# Patient Record
Sex: FEMALE | Race: WHITE | NOT HISPANIC OR LATINO | Employment: FULL TIME | URBAN - NONMETROPOLITAN AREA
[De-identification: names, ages, dates, MRNs, and addresses within clinical notes are randomized per-mention and may not be internally consistent; named-entity substitution may affect disease eponyms.]

---

## 2017-04-28 RX ORDER — SUMATRIPTAN 100 MG/1
100 TABLET, FILM COATED ORAL ONCE AS NEEDED
Qty: 9 TABLET | Refills: 6 | Status: SHIPPED | OUTPATIENT
Start: 2017-04-28 | End: 2018-06-20 | Stop reason: SDUPTHER

## 2017-05-22 RX ORDER — FLUTICASONE PROPIONATE 50 MCG
SPRAY, SUSPENSION (ML) NASAL
Qty: 16 G | Refills: 10 | Status: SHIPPED | OUTPATIENT
Start: 2017-05-22 | End: 2019-02-20

## 2018-06-20 ENCOUNTER — OFFICE VISIT (OUTPATIENT)
Dept: INTERNAL MEDICINE | Facility: CLINIC | Age: 53
End: 2018-06-20

## 2018-06-20 VITALS
BODY MASS INDEX: 27.43 KG/M2 | HEIGHT: 68 IN | DIASTOLIC BLOOD PRESSURE: 78 MMHG | WEIGHT: 181 LBS | OXYGEN SATURATION: 98 % | SYSTOLIC BLOOD PRESSURE: 118 MMHG | TEMPERATURE: 97.4 F | HEART RATE: 83 BPM

## 2018-06-20 DIAGNOSIS — R39.9 URINARY SYMPTOM OR SIGN: ICD-10-CM

## 2018-06-20 DIAGNOSIS — G43.909 MIGRAINE WITHOUT STATUS MIGRAINOSUS, NOT INTRACTABLE, UNSPECIFIED MIGRAINE TYPE: ICD-10-CM

## 2018-06-20 LAB
BILIRUB BLD-MCNC: NEGATIVE MG/DL
CLARITY, POC: CLEAR
COLOR UR: YELLOW
GLUCOSE UR STRIP-MCNC: NEGATIVE MG/DL
KETONES UR QL: NEGATIVE
LEUKOCYTE EST, POC: ABNORMAL
NITRITE UR-MCNC: NEGATIVE MG/ML
PH UR: 6 [PH] (ref 5–8)
PROT UR STRIP-MCNC: NEGATIVE MG/DL
RBC # UR STRIP: ABNORMAL /UL
SP GR UR: 1.02 (ref 1–1.03)
UROBILINOGEN UR QL: NORMAL

## 2018-06-20 PROCEDURE — 99396 PREV VISIT EST AGE 40-64: CPT | Performed by: PHYSICIAN ASSISTANT

## 2018-06-20 PROCEDURE — 81003 URINALYSIS AUTO W/O SCOPE: CPT | Performed by: PHYSICIAN ASSISTANT

## 2018-06-20 RX ORDER — SUMATRIPTAN 100 MG/1
100 TABLET, FILM COATED ORAL ONCE AS NEEDED
Qty: 9 TABLET | Refills: 6 | Status: SHIPPED | OUTPATIENT
Start: 2018-06-20 | End: 2019-09-09 | Stop reason: SDUPTHER

## 2018-06-22 LAB
BACTERIA UR CULT: NO GROWTH
BACTERIA UR CULT: NORMAL

## 2018-08-08 ENCOUNTER — TRANSCRIBE ORDERS (OUTPATIENT)
Dept: ADMINISTRATIVE | Facility: HOSPITAL | Age: 53
End: 2018-08-08

## 2018-08-08 ENCOUNTER — APPOINTMENT (OUTPATIENT)
Dept: BONE DENSITY | Facility: HOSPITAL | Age: 53
End: 2018-08-08

## 2018-08-08 DIAGNOSIS — M86.9 SAPHO SYNDROME (HCC): ICD-10-CM

## 2018-08-08 DIAGNOSIS — M86.9 SAPHO SYNDROME (HCC): Primary | ICD-10-CM

## 2018-08-08 DIAGNOSIS — M65.9 SAPHO SYNDROME (HCC): Primary | ICD-10-CM

## 2018-08-08 DIAGNOSIS — M85.80 SAPHO SYNDROME (HCC): ICD-10-CM

## 2018-08-08 DIAGNOSIS — M85.80 SAPHO SYNDROME (HCC): Primary | ICD-10-CM

## 2018-08-08 DIAGNOSIS — L40.3 SAPHO SYNDROME (HCC): Primary | ICD-10-CM

## 2018-08-08 DIAGNOSIS — L70.9 SAPHO SYNDROME (HCC): ICD-10-CM

## 2018-08-08 DIAGNOSIS — M65.9 SAPHO SYNDROME (HCC): ICD-10-CM

## 2018-08-08 DIAGNOSIS — L70.9 SAPHO SYNDROME (HCC): Primary | ICD-10-CM

## 2018-08-08 DIAGNOSIS — L40.3 SAPHO SYNDROME (HCC): ICD-10-CM

## 2018-08-08 PROCEDURE — 77080 DXA BONE DENSITY AXIAL: CPT

## 2018-09-24 ENCOUNTER — PREP FOR SURGERY (OUTPATIENT)
Dept: OTHER | Facility: HOSPITAL | Age: 53
End: 2018-09-24

## 2018-09-24 DIAGNOSIS — K21.00 REFLUX ESOPHAGITIS: Primary | ICD-10-CM

## 2018-09-27 PROBLEM — K21.00 REFLUX ESOPHAGITIS: Status: ACTIVE | Noted: 2018-09-27

## 2018-10-23 ENCOUNTER — TELEPHONE (OUTPATIENT)
Dept: SURGERY | Facility: CLINIC | Age: 53
End: 2018-10-23

## 2018-11-09 ENCOUNTER — TELEPHONE (OUTPATIENT)
Dept: SURGERY | Facility: CLINIC | Age: 53
End: 2018-11-09

## 2018-11-09 RX ORDER — MOMETASONE FUROATE 50 UG/1
2 SPRAY, METERED NASAL DAILY PRN
COMMUNITY
End: 2020-11-13

## 2018-11-09 RX ORDER — ALENDRONATE SODIUM 70 MG/1
70 TABLET ORAL
COMMUNITY
End: 2019-02-20

## 2018-11-12 ENCOUNTER — TELEPHONE (OUTPATIENT)
Dept: SURGERY | Facility: CLINIC | Age: 53
End: 2018-11-12

## 2018-11-12 NOTE — TELEPHONE ENCOUNTER
Patient called saying she wanted to canceled her EGD that was scheduled for today due to no tmeeting her deductible asks to have in  January 2019 . Put on recall for January 2019

## 2019-01-04 ENCOUNTER — TELEPHONE (OUTPATIENT)
Dept: SURGERY | Facility: CLINIC | Age: 54
End: 2019-01-04

## 2019-02-13 ENCOUNTER — TELEPHONE (OUTPATIENT)
Dept: SURGERY | Facility: CLINIC | Age: 54
End: 2019-02-13

## 2019-02-13 NOTE — TELEPHONE ENCOUNTER
Called the patient to remind them of an upcoming appointment with general surgery. I was able to reach to reach the patient. Left message on  cell.

## 2019-02-15 ENCOUNTER — ANESTHESIA (OUTPATIENT)
Dept: GASTROENTEROLOGY | Facility: HOSPITAL | Age: 54
End: 2019-02-15

## 2019-02-15 ENCOUNTER — HOSPITAL ENCOUNTER (OUTPATIENT)
Facility: HOSPITAL | Age: 54
Setting detail: HOSPITAL OUTPATIENT SURGERY
Discharge: HOME | End: 2019-02-15
Attending: SURGERY | Admitting: SURGERY

## 2019-02-15 ENCOUNTER — ANESTHESIA EVENT (OUTPATIENT)
Dept: GASTROENTEROLOGY | Facility: HOSPITAL | Age: 54
End: 2019-02-15

## 2019-02-15 VITALS
RESPIRATION RATE: 18 BRPM | HEIGHT: 68 IN | WEIGHT: 170 LBS | OXYGEN SATURATION: 100 % | HEART RATE: 79 BPM | BODY MASS INDEX: 25.76 KG/M2 | SYSTOLIC BLOOD PRESSURE: 106 MMHG | DIASTOLIC BLOOD PRESSURE: 83 MMHG | TEMPERATURE: 97.7 F

## 2019-02-15 DIAGNOSIS — K21.00 REFLUX ESOPHAGITIS: ICD-10-CM

## 2019-02-15 PROCEDURE — 25010000002 MIDAZOLAM PER 1 MG: Performed by: NURSE ANESTHETIST, CERTIFIED REGISTERED

## 2019-02-15 PROCEDURE — S0260 H&P FOR SURGERY: HCPCS | Performed by: SURGERY

## 2019-02-15 PROCEDURE — 25010000002 PROPOFOL 10 MG/ML EMULSION: Performed by: NURSE ANESTHETIST, CERTIFIED REGISTERED

## 2019-02-15 RX ORDER — MIDAZOLAM HYDROCHLORIDE 1 MG/ML
INJECTION INTRAMUSCULAR; INTRAVENOUS AS NEEDED
Status: DISCONTINUED | OUTPATIENT
Start: 2019-02-15 | End: 2019-02-15 | Stop reason: SURG

## 2019-02-15 RX ORDER — PROPOFOL 10 MG/ML
VIAL (ML) INTRAVENOUS AS NEEDED
Status: DISCONTINUED | OUTPATIENT
Start: 2019-02-15 | End: 2019-02-15 | Stop reason: SURG

## 2019-02-15 RX ORDER — ONDANSETRON 2 MG/ML
4 INJECTION INTRAMUSCULAR; INTRAVENOUS ONCE AS NEEDED
Status: DISCONTINUED | OUTPATIENT
Start: 2019-02-15 | End: 2019-02-15 | Stop reason: HOSPADM

## 2019-02-15 RX ORDER — SODIUM CHLORIDE, SODIUM LACTATE, POTASSIUM CHLORIDE, CALCIUM CHLORIDE 600; 310; 30; 20 MG/100ML; MG/100ML; MG/100ML; MG/100ML
1000 INJECTION, SOLUTION INTRAVENOUS CONTINUOUS
Status: DISCONTINUED | OUTPATIENT
Start: 2019-02-15 | End: 2019-02-15 | Stop reason: HOSPADM

## 2019-02-15 RX ORDER — SODIUM CHLORIDE 0.9 % (FLUSH) 0.9 %
3 SYRINGE (ML) INJECTION AS NEEDED
Status: DISCONTINUED | OUTPATIENT
Start: 2019-02-15 | End: 2019-02-15 | Stop reason: HOSPADM

## 2019-02-15 RX ADMIN — PROPOFOL 50 MG: 10 INJECTION, EMULSION INTRAVENOUS at 09:20

## 2019-02-15 RX ADMIN — SODIUM CHLORIDE, POTASSIUM CHLORIDE, SODIUM LACTATE AND CALCIUM CHLORIDE 1000 ML: 600; 310; 30; 20 INJECTION, SOLUTION INTRAVENOUS at 07:49

## 2019-02-15 RX ADMIN — MIDAZOLAM HYDROCHLORIDE 2 MG: 1 INJECTION, SOLUTION INTRAMUSCULAR; INTRAVENOUS at 09:20

## 2019-02-15 RX ADMIN — PROPOFOL 50 MG: 10 INJECTION, EMULSION INTRAVENOUS at 09:25

## 2019-02-15 NOTE — DISCHARGE INSTRUCTIONS
Please follow all post op instructions and follow up appointment time from your physician's office included in your discharge packet.    Rest today  No pushing,pulling,tugging,heavy lifting, or strenuous activity   No major decision making,driving,or drinking alcoholic beverages for 24 hours due to the sedation you received  Always use good hand hygiene/washing technique  No driving on pain medication.    To assist you in voiding:  Drink plenty of fluids  Listen to running water while attempting to void.    If you are unable to urinate and you have an uncomfortable urge to void or it has been   6 hours since you were discharged, return to the Emergency Room.

## 2019-02-15 NOTE — ANESTHESIA PREPROCEDURE EVALUATION
Anesthesia Evaluation     Patient summary reviewed and Nursing notes reviewed   NPO Solid Status: > 8 hours  NPO Liquid Status: > 8 hours           Airway   Mallampati: II  TM distance: >3 FB  Neck ROM: full  No difficulty expected  Dental      Pulmonary    Cardiovascular   Exercise tolerance: good (4-7 METS)    (+) valvular problems/murmurs MVP,       Neuro/Psych  (+) headaches,     GI/Hepatic/Renal/Endo    (+)  GERD,      Musculoskeletal     Abdominal    Substance History      OB/GYN          Other   (+) arthritis                     Anesthesia Plan    ASA 2     MAC     intravenous induction   Anesthetic plan, all risks, benefits, and alternatives have been provided, discussed and informed consent has been obtained with: patient.    Plan discussed with CRNA.

## 2019-02-15 NOTE — H&P
[facility]   HISTORY AND PHYSICAL      Name:  Agatha Carbajal   Age:  53 y.o.  Sex:  female  :  1965  MRN:  [id number]   Visit Number:  [id number]  Admission Date:  2/15/2019  Date Of Service:  02/15/19  Primary Care Physician:  Fawn Fischer MD    Chief Complaint:     Reflux esophagitis    History Of Presenting Illness:      Patient here for EGD, has chronic reflux about once to twice a week, resolved with over-the-counter antacids.  Also has hoarseness and constant cough.  Concerned about significant esophagitis and reflux symptoms    Review Of Systems:     General ROS: Patient denies any fevers, chills or loss of consciousness.  No complaints of generalized weakness  Psychological ROS: Denies any hallucinations and delusions.  Ophthalmic ROS: no transient loss of vision.  ENT ROS: Denies sore throat, nasal congestion or ear pain.   Allergy and Immunology ROS: Denies rash or itching.  Hematological and Lymphatic ROS: Denies neck swelling or easy bleeding.  Endocrine ROS: Denies any recent unintentional weight gain or loss.  Breast ROS: Denies any pain or swelling.  Respiratory ROS: Denies cough or shortness of breath.   Cardiovascular ROS: Denies chest pain or palpitations. No history of exertional chest pain.   Gastrointestinal ROS: Denies nausea and vomiting. Denies any abdominal pain. No diarrhea.   Genito-Urinary ROS: Denies dysuria or hematuria.  Musculoskeletal ROS: no back pain. No muscle pain. No calf pain.   Neurological ROS: Denies any focal weakness. No loss of consciousness. Denies any numbness.   Dermatological ROS: Denies any redness or pruritis.     Past Medical History:    Past Medical History:   Diagnosis Date   • Acid reflux    • Arthritis    • Bilateral ovarian cysts    • Breast cancer (CMS/HCC) 2009    RIGHT   • Bronchitis    • Kidney stone    • Migraine headache    • MVP (mitral valve prolapse)    • Osteoporosis    • Piercing     EARS ONLY   • Skin cancer      BASAL CELL ON CHEST, SQUAMOUS CELL RIGHT ARM   • Tinnitus    • Wears glasses     TO READ       Past Surgical history:    Past Surgical History:   Procedure Laterality Date   • BREAST AUGMENTATION Bilateral 2010   • BREAST BIOPSY Right    • COLONOSCOPY  2016   • HYSTERECTOMY      COMPLETE   • MASTECTOMY Bilateral 2009   • SKIN BIOPSY      chest, right arm       Social History:    Social History     Socioeconomic History   • Marital status:      Spouse name: Not on file   • Number of children: Not on file   • Years of education: Not on file   • Highest education level: Not on file   Social Needs   • Financial resource strain: Not on file   • Food insecurity - worry: Not on file   • Food insecurity - inability: Not on file   • Transportation needs - medical: Not on file   • Transportation needs - non-medical: Not on file   Occupational History   • Not on file   Tobacco Use   • Smoking status: Never Smoker   • Smokeless tobacco: Never Used   Substance and Sexual Activity   • Alcohol use: No   • Drug use: No   • Sexual activity: Defer   Other Topics Concern   • Not on file   Social History Narrative   • Not on file       Family History:    Family History   Problem Relation Age of Onset   • Arthritis Mother    • Migraines Mother    • Prostate cancer Father    • Hypertension Father    • Kidney disease Father    • Obesity Father    • Breast cancer Sister    • Heart disease Maternal Grandmother    • Osteoporosis Maternal Grandmother    • Heart disease Paternal Grandfather    • Tuberculosis Paternal Grandfather        Allergies:      Demerol [meperidine] and Oxycodone    Home Medications:    Prior to Admission Medications     Prescriptions Last Dose Informant Patient Reported? Taking?    calcium citrate-vitamin d (CALCITRATE) 315-250 MG-UNIT tablet tablet 2/14/2019  Yes Yes    Take 2 tablets by mouth Daily.    ibuprofen (ADVIL,MOTRIN) 800 MG tablet Past Month Self Yes Yes    Take 800 mg by mouth Every 6 (Six) Hours  As Needed.    mometasone (NASONEX) 50 MCG/ACT nasal spray Past Week Self Yes Yes    2 sprays into the nostril(s) as directed by provider Daily As Needed.    SUMAtriptan (IMITREX) 100 MG tablet 2/14/2019 Self No Yes    Take 1 tablet by mouth 1 (One) Time As Needed for Migraine for up to 2 doses.    alendronate (FOSAMAX) 70 MG tablet  Self Yes No    Take 70 mg by mouth Every 7 (Seven) Days. ON TUESDAYS    fluticasone (FLONASE) 50 MCG/ACT nasal spray More than a month Self No No    INHALE 1 SPRAY IN EACH NOSTRIL TWO TIMES A DAY FOR 30 DAYS AS DIRECTED BY DOCTOR              ED Medications:    Medications   lactated ringers infusion 1,000 mL (1,000 mL Intravenous New Bag 2/15/19 0749)       Vital Signs:    Temp:  [97 °F (36.1 °C)] 97 °F (36.1 °C)  Heart Rate:  [78] 78  Resp:  [18] 18  BP: (114)/(74) 114/74        11/09/18  0940 12/28/18  1519 02/13/19  1419   Weight: 77.1 kg (170 lb) 77.1 kg (170 lb) 77.1 kg (170 lb)       Body mass index is 25.85 kg/m².    Physical Exam:      General Appearance:  Alert and cooperative, not in any acute distress.   Head:  Atraumatic and normocephalic, without obvious abnormality.   Eyes:          PERRLA, conjunctivae and sclerae normal, no Icterus. No pallor. Extra-occular movements are within normal limits.   Ears:  Ears appear intact with no abnormalities noted.   Throat: No oral lesions, no thrush, oral mucosa moist.   Neck: Supple, trachea midline, no thyromegaly, no carotid bruit.       Respiratory/Lungs:   Breath sounds heard bilaterally equally.  No crackles or wheezing. No Pleural rub or bronchial breathing. Normal respiratory effort.    Cardiovascular/Heart:  Normal S1 and S2, no murmur. No edema   GI/Abdomen:   No masses, no hepatosplenomegaly. Soft, non-tender, non-distended, no hernia                 Musculoskeletal/ Extremities:   Moves all extremities well   Pulses: Pulses palpable and equal bilaterally   Skin: No bleeding, bruising or rash, no induration   Lymph nodes: No  palpable adenopathy   Psychiatric : Alert and oriented ×3.  No depression or anxiety            EKG:      None    Labs:    Lab Results (last 24 hours)     ** No results found for the last 24 hours. **          Radiology:    Imaging Results (last 72 hours)     ** No results found for the last 72 hours. **          Assessment:    Reflux esophagitis, hoarseness with concern for esophagitis     Plan:     EGD with biopsy today.  No previous EGD in the past.    Mick Triplett MD  02/15/19  9:26 AM

## 2019-02-15 NOTE — ANESTHESIA POSTPROCEDURE EVALUATION
Patient: Agatha Carbajal    Procedure Summary     Date:  02/15/19 Room / Location:  [facility] ENDOSCOPY 3 / [facility] ENDOSCOPY    Anesthesia Start:  0923 Anesthesia Stop:  0936    Procedure:  ESOPHAGOGASTRODUODENOSCOPY WITH COLD FORCEP BIOPSIES (N/A Esophagus) Diagnosis:       Reflux esophagitis      (Reflux esophagitis [K21.0])    Surgeon:  Mick Triplett MD Provider:  Conrad Coker CRNA    Anesthesia Type:  MAC ASA Status:  2          Anesthesia Type: MAC  Last vitals  BP   123/85 (02/15/19 0937)   Temp   97.7 °F (36.5 °C) (02/15/19 0937)   Pulse   105 (02/15/19 0937)   Resp   16 (02/15/19 0937)     SpO2   98 % (02/15/19 0937)     Post Anesthesia Care and Evaluation    Patient location during evaluation: bedside  Patient participation: complete - patient participated  Level of consciousness: awake  Pain score: 0  Pain management: adequate  Airway patency: patent  Anesthetic complications: No anesthetic complications  PONV Status: controlled  Cardiovascular status: acceptable and stable  Respiratory status: acceptable and room air  Hydration status: acceptable

## 2019-02-18 ENCOUNTER — TELEPHONE (OUTPATIENT)
Dept: SURGERY | Facility: CLINIC | Age: 54
End: 2019-02-18

## 2019-02-20 ENCOUNTER — OFFICE VISIT (OUTPATIENT)
Dept: SURGERY | Facility: CLINIC | Age: 54
End: 2019-02-20

## 2019-02-20 VITALS
HEIGHT: 68 IN | HEART RATE: 84 BPM | SYSTOLIC BLOOD PRESSURE: 120 MMHG | TEMPERATURE: 95.5 F | DIASTOLIC BLOOD PRESSURE: 70 MMHG | WEIGHT: 170 LBS | OXYGEN SATURATION: 97 % | BODY MASS INDEX: 25.76 KG/M2

## 2019-02-20 DIAGNOSIS — K21.00 GASTROESOPHAGEAL REFLUX DISEASE WITH ESOPHAGITIS: Primary | ICD-10-CM

## 2019-02-20 DIAGNOSIS — R10.13 EPIGASTRIC PAIN: ICD-10-CM

## 2019-02-20 DIAGNOSIS — H65.06 RECURRENT ACUTE SEROUS OTITIS MEDIA OF BOTH EARS: Primary | ICD-10-CM

## 2019-02-20 PROCEDURE — 99212 OFFICE O/P EST SF 10 MIN: CPT | Performed by: SURGERY

## 2019-02-20 NOTE — PROGRESS NOTES
Patient: Agatha Carbajal    YOB: 1965    Date: 02/20/2019    Primary Care Provider: Fawn Fischer MD    Reason for Consultation: Follow-up EGD    Chief Complaint   Patient presents with   • Follow-up     EGD       History of present illness:  I saw the patient in the office today as a followup from their recent EGD with biopsy, the pathology report did show squamous mucosa with features suggestive of reflux esophagitis, glandular mucosa with mild chronic inflammation.  They state that they have done well and is not having any problems.  Patient complained of persistent foamy fluid, without and a chronic cough.  Patient has from the fluid behind the ears, hearing and recommended 3 years ago.  Patient would like to have an ENT evaluation for allergies and reason for sinus issues and ear problems.  Reflux symptoms controlled with Pepcid complete and avoiding caffeine and late meals.    The following portions of the patient's history were reviewed and updated as appropriate: allergies, current medications, past family history, past medical history, past social history, past surgical history and problem list.      Review of Systems   Constitutional: Negative for activity change, appetite change, chills, fever and unexpected weight change.   HENT: Negative for congestion, hearing loss, trouble swallowing and voice change.    Eyes: Negative for visual disturbance.   Respiratory: Negative for apnea, cough, choking, chest tightness, shortness of breath and wheezing.    Cardiovascular: Negative for chest pain, palpitations and leg swelling.   Gastrointestinal: Negative for abdominal distention, abdominal pain, anal bleeding, blood in stool, constipation, diarrhea, nausea, rectal pain and vomiting.   Endocrine: Negative for cold intolerance and heat intolerance.   Genitourinary: Negative for difficulty urinating, dysuria, flank pain and frequency.   Musculoskeletal: Negative for back pain, gait problem and  "myalgias.   Skin: Negative for color change, rash and wound.   Neurological: Negative for dizziness, seizures, syncope, facial asymmetry, speech difficulty, weakness, light-headedness, numbness and headaches.   Hematological: Negative for adenopathy. Does not bruise/bleed easily.   Psychiatric/Behavioral: Negative for agitation, behavioral problems and confusion. The patient is not nervous/anxious.        Vital Signs:   Vitals:    02/20/19 0831   BP: 120/70   Pulse: 84   Temp: 95.5 °F (35.3 °C)   TempSrc: Temporal   SpO2: 97%   Weight: 77.1 kg (170 lb)   Height: 172.7 cm (68\")       Allergies:  Allergies   Allergen Reactions   • Demerol [Meperidine] Nausea And Vomiting   • Oxycodone Other (See Comments)     HEADACHE         Medications:    Current Outpatient Medications:   •  calcium citrate-vitamin d (CALCITRATE) 315-250 MG-UNIT tablet tablet, Take 2 tablets by mouth Daily., Disp: , Rfl:   •  ibuprofen (ADVIL,MOTRIN) 800 MG tablet, Take 800 mg by mouth Every 6 (Six) Hours As Needed., Disp: , Rfl:   •  mometasone (NASONEX) 50 MCG/ACT nasal spray, 2 sprays into the nostril(s) as directed by provider Daily As Needed., Disp: , Rfl:   •  SUMAtriptan (IMITREX) 100 MG tablet, Take 1 tablet by mouth 1 (One) Time As Needed for Migraine for up to 2 doses., Disp: 9 tablet, Rfl: 6    Physical Exam:   General Appearance:    Alert, cooperative, in no acute distress   Abdomen:     no masses, no organomegaly, soft non-tender, non-distended, no guarding, wounds are well healed, no evidence of recurrent hernia   Chest:      Clear toausculation            Cor:  Regular rate and rhythm      Results Review:   I reviewed the patient's new clinical results.    Assessment / Plan:    1. Gastroesophageal reflux disease with esophagitis    2. Epigastric pain        I did discuss the situation with the patient today in the office and they have done well from their recent EGD with biopsy. I have told the patient to continue Pepcid complete.  " If symptoms do not improve, we will call and probably safer daily consumption.  Also referred to ENT for evaluation of fluid behind the ears and sinus drainage with chronic allergies..    Electronically signed by Mick Triplett MD  02/20/19

## 2019-02-21 LAB
LAB AP CASE REPORT: NORMAL
PATH REPORT.FINAL DX SPEC: NORMAL

## 2019-03-07 ENCOUNTER — OFFICE VISIT (OUTPATIENT)
Dept: INTERNAL MEDICINE | Facility: CLINIC | Age: 54
End: 2019-03-07

## 2019-03-07 VITALS
BODY MASS INDEX: 27.28 KG/M2 | OXYGEN SATURATION: 98 % | WEIGHT: 180 LBS | HEIGHT: 68 IN | RESPIRATION RATE: 16 BRPM | TEMPERATURE: 97.1 F | HEART RATE: 89 BPM | SYSTOLIC BLOOD PRESSURE: 139 MMHG | DIASTOLIC BLOOD PRESSURE: 79 MMHG

## 2019-03-07 DIAGNOSIS — J20.9 ACUTE BRONCHITIS WITH BRONCHOSPASM: Primary | ICD-10-CM

## 2019-03-07 DIAGNOSIS — H66.003 ACUTE SUPPURATIVE OTITIS MEDIA OF BOTH EARS WITHOUT SPONTANEOUS RUPTURE OF TYMPANIC MEMBRANES, RECURRENCE NOT SPECIFIED: ICD-10-CM

## 2019-03-07 DIAGNOSIS — J01.01 ACUTE RECURRENT MAXILLARY SINUSITIS: ICD-10-CM

## 2019-03-07 PROCEDURE — 99213 OFFICE O/P EST LOW 20 MIN: CPT | Performed by: INTERNAL MEDICINE

## 2019-03-07 PROCEDURE — 96372 THER/PROPH/DIAG INJ SC/IM: CPT | Performed by: INTERNAL MEDICINE

## 2019-03-07 RX ORDER — DOXYCYCLINE 100 MG/1
100 CAPSULE ORAL 2 TIMES DAILY
Qty: 20 CAPSULE | Refills: 0 | Status: SHIPPED | OUTPATIENT
Start: 2019-03-07 | End: 2019-03-17

## 2019-03-07 RX ORDER — METHYLPREDNISOLONE 4 MG/1
TABLET ORAL
Qty: 21 TABLET | Refills: 0 | Status: SHIPPED | OUTPATIENT
Start: 2019-03-07 | End: 2019-03-28

## 2019-03-07 RX ORDER — METHYLPREDNISOLONE SODIUM SUCCINATE 125 MG/2ML
125 INJECTION, POWDER, LYOPHILIZED, FOR SOLUTION INTRAMUSCULAR; INTRAVENOUS ONCE
Status: COMPLETED | OUTPATIENT
Start: 2019-03-07 | End: 2019-03-07

## 2019-03-07 RX ORDER — ALBUTEROL SULFATE 90 UG/1
2 AEROSOL, METERED RESPIRATORY (INHALATION) EVERY 4 HOURS PRN
Qty: 1 INHALER | Refills: 2 | Status: SHIPPED | OUTPATIENT
Start: 2019-03-07 | End: 2020-11-13

## 2019-03-07 RX ADMIN — METHYLPREDNISOLONE SODIUM SUCCINATE 125 MG: 125 INJECTION, POWDER, LYOPHILIZED, FOR SOLUTION INTRAMUSCULAR; INTRAVENOUS at 16:35

## 2019-03-07 NOTE — PROGRESS NOTES
Subjective   Agatha Carbajal is a 53 y.o. female.     Chief Complaint   Patient presents with   • Cough   • Nasal Congestion   • Sinus Problem   • Earache       History of Present Illness   Patient complains of cough ,    Nasal congestion and  Sinus congestion  And ear fullness  With pain in both ears , she took otc mucinex dm , she states she had a fever of 99,  she states she went to the  [facility]  And was on bactrim and she got a rash , she stopped it and then went back and was put on ceftin she finished it a 10 days ago and she also then went back and was  Put on prednisone  For 5 days  And finished it  2-3 days ago ,  She states now she is yet symptomatic    The following portions of the patient's history were reviewed and updated as appropriate: allergies, current medications, past family history, past medical history, past social history, past surgical history and problem list.    Review of Systems   Constitutional: Negative for chills, fatigue and fever.   HENT: Positive for congestion, ear pain, postnasal drip, rhinorrhea, sinus pressure and sinus pain.    Eyes: Negative for pain and itching.   Respiratory: Positive for cough. Negative for chest tightness and shortness of breath.    Cardiovascular: Negative for chest pain and palpitations.   Gastrointestinal: Negative for blood in stool, constipation, diarrhea and nausea.   Endocrine: Negative for polydipsia, polyphagia and polyuria.   Genitourinary: Negative for dysuria, flank pain and frequency.   Musculoskeletal: Negative for arthralgias and back pain.   Skin: Negative for rash and wound.   Neurological: Negative for syncope, light-headedness, numbness and headaches.   Hematological: Negative for adenopathy.   Psychiatric/Behavioral: Negative for behavioral problems, decreased concentration, dysphoric mood and sleep disturbance. The patient is not nervous/anxious.          Current Outpatient Medications:   •  calcium citrate-vitamin d (CALCITRATE)  "315-250 MG-UNIT tablet tablet, Take 2 tablets by mouth Daily., Disp: , Rfl:   •  ibuprofen (ADVIL,MOTRIN) 800 MG tablet, Take 800 mg by mouth Every 6 (Six) Hours As Needed., Disp: , Rfl:   •  mometasone (NASONEX) 50 MCG/ACT nasal spray, 2 sprays into the nostril(s) as directed by provider Daily As Needed., Disp: , Rfl:   •  SUMAtriptan (IMITREX) 100 MG tablet, Take 1 tablet by mouth 1 (One) Time As Needed for Migraine for up to 2 doses., Disp: 9 tablet, Rfl: 6  •  albuterol sulfate  (90 Base) MCG/ACT inhaler, Inhale 2 puffs Every 4 (Four) Hours As Needed for Wheezing., Disp: 1 inhaler, Rfl: 2  •  doxycycline (MONODOX) 100 MG capsule, Take 1 capsule by mouth 2 (Two) Times a Day for 10 days., Disp: 20 capsule, Rfl: 0  •  methylPREDNISolone (MEDROL, MÓNICA,) 4 MG tablet, Take as directed on package instructions., Disp: 21 tablet, Rfl: 0    Objective     Blood pressure 139/79, pulse 89, temperature 97.1 °F (36.2 °C), resp. rate 16, height 172.7 cm (67.99\"), weight 81.6 kg (180 lb), SpO2 98 %, not currently breastfeeding.    Physical Exam   Constitutional: She is oriented to person, place, and time. She appears well-developed and well-nourished.   HENT:   Head: Normocephalic and atraumatic.   Right Ear: Tympanic membrane is erythematous.   Left Ear: Tympanic membrane is erythematous.   Nose: Rhinorrhea present. Right sinus exhibits maxillary sinus tenderness. Left sinus exhibits maxillary sinus tenderness.   Mouth/Throat: Oropharyngeal exudate present.   Eyes: Conjunctivae and EOM are normal. Pupils are equal, round, and reactive to light.   Neck: Normal range of motion. Neck supple.   Cardiovascular: Normal rate, regular rhythm and normal heart sounds.   Pulmonary/Chest: Effort normal. She has wheezes.   Abdominal: Soft. Bowel sounds are normal.   Musculoskeletal: Normal range of motion. She exhibits no edema, tenderness or deformity.   Neurological: She is alert and oriented to person, place, and time.   Skin: Skin " is warm and dry.   Psychiatric: She has a normal mood and affect.     Patient's Body mass index is 27.38 kg/m². BMI is within normal parameters. No follow-up required..      Results for orders placed or performed during the hospital encounter of 02/15/19   Tissue Pathology Exam   Result Value Ref Range    Case Report       Surgical Pathology Report                         Case: [id number]                                  Authorizing Provider:  Mick Triplett MD        Collected:           02/15/2019 09:30 AM          Ordering Location:     [facility]    Received:            02/15/2019 02:55 PM                                 [facility]                                                                    Pathologist:           Landon Winn MD                                                             Specimen:    Esophagus, ESOPHAGEAL BIOPSIES                                                             Final Diagnosis       See Scanned Report             Assessment/Plan   Agatha was seen today for cough, nasal congestion, sinus problem and earache.    Diagnoses and all orders for this visit:    Acute bronchitis with bronchospasm  -     methylPREDNISolone sodium succinate (SOLU-Medrol) injection 125 mg; Inject 2 mL into the appropriate muscle as directed by prescriber 1 (One) Time.    Acute suppurative otitis media of both ears without spontaneous rupture of tympanic membranes, recurrence not specified    Acute recurrent maxillary sinusitis    Other orders  -     doxycycline (MONODOX) 100 MG capsule; Take 1 capsule by mouth 2 (Two) Times a Day for 10 days.  -     albuterol sulfate  (90 Base) MCG/ACT inhaler; Inhale 2 puffs Every 4 (Four) Hours As Needed for Wheezing.  -     methylPREDNISolone (MEDROL, MÓNICA,) 4 MG tablet; Take as directed on package instructions.       Plan :   1. Acute bronchitis: We will start oral antibiotics , IM solumedrol given today , start medrol dose pack  And inhaler  q4-6  hrs prn  2.Acute suppurative otitis media: will   start oral antibiotics    3.Acute maxillary sinusitis:   will   start oral antibiotics                Fawn Fischer MD

## 2019-03-13 ENCOUNTER — TELEPHONE (OUTPATIENT)
Dept: INTERNAL MEDICINE | Facility: CLINIC | Age: 54
End: 2019-03-13

## 2019-03-13 NOTE — TELEPHONE ENCOUNTER
Called reporting that she is feeling much better since her visit 3/7/2019, but that she feels like she is having some tightening in her throat or what she thinks may be tightening in her throat  She is concerned that this could be due to the ABX has not been using her inhaler because Dr. CLARK advised her not to unless she was wheezing  Wanted to know what she should do skipped her last dose of ABX   Pt has finished the steroid pack

## 2019-03-13 NOTE — TELEPHONE ENCOUNTER
Ok to hold off on last dose of antibiotics.  Steroids may help with swelling.  If symptoms are stable and are not worsening, it is reasonable to wait it out.  She can take a benedryl to also help with swelling.  If there is any other concerns, it may be best to come in for appointment.     Should swelling worsen where it becomes hard to breath, she should come in to the ER.

## 2019-03-27 ENCOUNTER — TELEPHONE (OUTPATIENT)
Dept: INTERNAL MEDICINE | Facility: CLINIC | Age: 54
End: 2019-03-27

## 2019-03-27 DIAGNOSIS — R39.9 URINARY SYMPTOM OR SIGN: Primary | ICD-10-CM

## 2019-03-27 NOTE — TELEPHONE ENCOUNTER
Patient called and states that she was prescribed doxycycline from  she states that she developed a reaction. She states she was suppose to take it for 10 days and she only completed 5 days. She states she called our office and the nurse advised her to stop taking this medication. She states she is having returning symptoms of bronchitis. She also states that she possibly has a UTI. She would like to see if there is something else she  could try, she states that she doesn't want to do any more antibiotics.

## 2019-03-28 ENCOUNTER — OFFICE VISIT (OUTPATIENT)
Dept: INTERNAL MEDICINE | Facility: CLINIC | Age: 54
End: 2019-03-28

## 2019-03-28 VITALS
TEMPERATURE: 98.9 F | HEIGHT: 68 IN | RESPIRATION RATE: 16 BRPM | OXYGEN SATURATION: 99 % | DIASTOLIC BLOOD PRESSURE: 79 MMHG | WEIGHT: 185 LBS | SYSTOLIC BLOOD PRESSURE: 139 MMHG | BODY MASS INDEX: 28.04 KG/M2 | HEART RATE: 90 BPM

## 2019-03-28 DIAGNOSIS — K21.00 REFLUX ESOPHAGITIS: ICD-10-CM

## 2019-03-28 DIAGNOSIS — R31.9 URINARY TRACT INFECTION WITH HEMATURIA, SITE UNSPECIFIED: Primary | ICD-10-CM

## 2019-03-28 DIAGNOSIS — N39.0 URINARY TRACT INFECTION WITH HEMATURIA, SITE UNSPECIFIED: Primary | ICD-10-CM

## 2019-03-28 DIAGNOSIS — K59.04 CHRONIC IDIOPATHIC CONSTIPATION: ICD-10-CM

## 2019-03-28 DIAGNOSIS — R03.0 ELEVATED BLOOD PRESSURE READING: ICD-10-CM

## 2019-03-28 LAB
BILIRUB BLD-MCNC: ABNORMAL MG/DL
CLARITY, POC: ABNORMAL
COLOR UR: YELLOW
GLUCOSE UR STRIP-MCNC: NEGATIVE MG/DL
KETONES UR QL: NEGATIVE
LEUKOCYTE EST, POC: ABNORMAL
NITRITE UR-MCNC: POSITIVE MG/ML
PH UR: 7 [PH] (ref 5–8)
PROT UR STRIP-MCNC: ABNORMAL MG/DL
RBC # UR STRIP: ABNORMAL /UL
SP GR UR: 1.03 (ref 1–1.03)
UROBILINOGEN UR QL: ABNORMAL

## 2019-03-28 PROCEDURE — 99214 OFFICE O/P EST MOD 30 MIN: CPT | Performed by: NURSE PRACTITIONER

## 2019-03-28 PROCEDURE — 81003 URINALYSIS AUTO W/O SCOPE: CPT | Performed by: NURSE PRACTITIONER

## 2019-03-28 RX ORDER — NITROFURANTOIN 25; 75 MG/1; MG/1
100 CAPSULE ORAL 2 TIMES DAILY
Qty: 14 CAPSULE | Refills: 0 | Status: SHIPPED | OUTPATIENT
Start: 2019-03-28 | End: 2019-04-03

## 2019-03-28 RX ORDER — RANITIDINE 300 MG/1
300 TABLET ORAL NIGHTLY
Qty: 30 TABLET | Refills: 1 | Status: SHIPPED | OUTPATIENT
Start: 2019-03-28 | End: 2020-11-13

## 2019-03-28 NOTE — TELEPHONE ENCOUNTER
called stating that pt has symptoms of a UTI and would like a return call  I have tried to contact at number listed and given by  no answer, VM full, unable to leave a message  Per Dr. Fischer pt needs to leave urine for a culture

## 2019-03-31 ENCOUNTER — HOSPITAL ENCOUNTER (EMERGENCY)
Facility: HOSPITAL | Age: 54
Discharge: HOME | End: 2019-03-31
Attending: EMERGENCY MEDICINE | Admitting: EMERGENCY MEDICINE

## 2019-03-31 VITALS
TEMPERATURE: 98 F | OXYGEN SATURATION: 97 % | DIASTOLIC BLOOD PRESSURE: 83 MMHG | WEIGHT: 170 LBS | SYSTOLIC BLOOD PRESSURE: 122 MMHG | RESPIRATION RATE: 17 BRPM | BODY MASS INDEX: 25.76 KG/M2 | HEIGHT: 68 IN | HEART RATE: 84 BPM

## 2019-03-31 DIAGNOSIS — R00.2 PALPITATIONS: Primary | ICD-10-CM

## 2019-03-31 LAB
ALBUMIN SERPL-MCNC: 4.7 G/DL (ref 3.5–5)
ALBUMIN/GLOB SERPL: 1.4 G/DL (ref 1–2)
ALP SERPL-CCNC: 97 U/L (ref 38–126)
ALT SERPL W P-5'-P-CCNC: 24 U/L (ref 13–69)
ANION GAP SERPL CALCULATED.3IONS-SCNC: 13.8 MMOL/L (ref 10–20)
AST SERPL-CCNC: 23 U/L (ref 15–46)
BACTERIA UR CULT: ABNORMAL
BACTERIA UR CULT: ABNORMAL
BACTERIA UR QL AUTO: ABNORMAL /HPF
BASOPHILS # BLD AUTO: 0.04 10*3/MM3 (ref 0–0.2)
BASOPHILS NFR BLD AUTO: 0.4 % (ref 0–1.5)
BILIRUB SERPL-MCNC: 0.6 MG/DL (ref 0.2–1.3)
BILIRUB UR QL STRIP: NEGATIVE
BUN BLD-MCNC: 13 MG/DL (ref 7–20)
BUN/CREAT SERPL: 16.3 (ref 7.1–23.5)
CALCIUM SPEC-SCNC: 9.8 MG/DL (ref 8.4–10.2)
CHLORIDE SERPL-SCNC: 102 MMOL/L (ref 98–107)
CLARITY UR: CLEAR
CO2 SERPL-SCNC: 27 MMOL/L (ref 26–30)
COLOR UR: YELLOW
CREAT BLD-MCNC: 0.8 MG/DL (ref 0.6–1.3)
D DIMER PPP FEU-MCNC: 0.29 MCGFEU/ML (ref 0–0.57)
DEPRECATED RDW RBC AUTO: 45.8 FL (ref 37–54)
EOSINOPHIL # BLD AUTO: 0.03 10*3/MM3 (ref 0–0.4)
EOSINOPHIL NFR BLD AUTO: 0.3 % (ref 0.3–6.2)
ERYTHROCYTE [DISTWIDTH] IN BLOOD BY AUTOMATED COUNT: 14.3 % (ref 12.3–15.4)
GFR SERPL CREATININE-BSD FRML MDRD: 75 ML/MIN/1.73
GLOBULIN UR ELPH-MCNC: 3.3 GM/DL
GLUCOSE BLD-MCNC: 118 MG/DL (ref 74–98)
GLUCOSE UR STRIP-MCNC: NEGATIVE MG/DL
HCT VFR BLD AUTO: 39.8 % (ref 34–46.6)
HGB BLD-MCNC: 13 G/DL (ref 12–15.9)
HGB UR QL STRIP.AUTO: ABNORMAL
HOLD SPECIMEN: NORMAL
HOLD SPECIMEN: NORMAL
HYALINE CASTS UR QL AUTO: ABNORMAL /LPF
IMM GRANULOCYTES # BLD AUTO: 0.04 10*3/MM3 (ref 0–0.05)
IMM GRANULOCYTES NFR BLD AUTO: 0.4 % (ref 0–0.5)
KETONES UR QL STRIP: NEGATIVE
LEUKOCYTE ESTERASE UR QL STRIP.AUTO: ABNORMAL
LYMPHOCYTES # BLD AUTO: 2 10*3/MM3 (ref 0.7–3.1)
LYMPHOCYTES NFR BLD AUTO: 20.5 % (ref 19.6–45.3)
MCH RBC QN AUTO: 28.5 PG (ref 26.6–33)
MCHC RBC AUTO-ENTMCNC: 32.7 G/DL (ref 31.5–35.7)
MCV RBC AUTO: 87.3 FL (ref 79–97)
MONOCYTES # BLD AUTO: 0.56 10*3/MM3 (ref 0.1–0.9)
MONOCYTES NFR BLD AUTO: 5.7 % (ref 5–12)
NEUTROPHILS # BLD AUTO: 7.07 10*3/MM3 (ref 1.4–7)
NEUTROPHILS NFR BLD AUTO: 72.7 % (ref 42.7–76)
NITRITE UR QL STRIP: NEGATIVE
NRBC BLD AUTO-RTO: 0 /100 WBC (ref 0–0)
OTHER ANTIBIOTIC SUSC ISLT: ABNORMAL
PH UR STRIP.AUTO: 7 [PH] (ref 5–8)
PLATELET # BLD AUTO: 327 10*3/MM3 (ref 140–450)
PMV BLD AUTO: 9.8 FL (ref 6–12)
POTASSIUM BLD-SCNC: 3.8 MMOL/L (ref 3.5–5.1)
PROT SERPL-MCNC: 8 G/DL (ref 6.3–8.2)
PROT UR QL STRIP: NEGATIVE
RBC # BLD AUTO: 4.56 10*6/MM3 (ref 3.77–5.28)
RBC # UR: ABNORMAL /HPF
REF LAB TEST METHOD: ABNORMAL
SODIUM BLD-SCNC: 139 MMOL/L (ref 137–145)
SP GR UR STRIP: <=1.005 (ref 1–1.03)
SQUAMOUS #/AREA URNS HPF: ABNORMAL /HPF
TRANS CELLS #/AREA URNS HPF: ABNORMAL /HPF
TROPONIN I SERPL-MCNC: <0.012 NG/ML (ref 0–0.03)
UROBILINOGEN UR QL STRIP: ABNORMAL
WBC NRBC COR # BLD: 9.74 10*3/MM3 (ref 3.4–10.8)
WBC UR QL AUTO: ABNORMAL /HPF
WHOLE BLOOD HOLD SPECIMEN: NORMAL

## 2019-03-31 PROCEDURE — 85379 FIBRIN DEGRADATION QUANT: CPT | Performed by: EMERGENCY MEDICINE

## 2019-03-31 PROCEDURE — 96360 HYDRATION IV INFUSION INIT: CPT

## 2019-03-31 PROCEDURE — 93005 ELECTROCARDIOGRAM TRACING: CPT | Performed by: EMERGENCY MEDICINE

## 2019-03-31 PROCEDURE — 80053 COMPREHEN METABOLIC PANEL: CPT | Performed by: EMERGENCY MEDICINE

## 2019-03-31 PROCEDURE — 84484 ASSAY OF TROPONIN QUANT: CPT | Performed by: EMERGENCY MEDICINE

## 2019-03-31 PROCEDURE — 81001 URINALYSIS AUTO W/SCOPE: CPT | Performed by: EMERGENCY MEDICINE

## 2019-03-31 PROCEDURE — 85025 COMPLETE CBC W/AUTO DIFF WBC: CPT | Performed by: EMERGENCY MEDICINE

## 2019-03-31 PROCEDURE — 99284 EMERGENCY DEPT VISIT MOD MDM: CPT

## 2019-03-31 RX ORDER — SODIUM CHLORIDE 0.9 % (FLUSH) 0.9 %
10 SYRINGE (ML) INJECTION AS NEEDED
Status: DISCONTINUED | OUTPATIENT
Start: 2019-03-31 | End: 2019-04-01 | Stop reason: HOSPADM

## 2019-03-31 RX ORDER — AMOXICILLIN AND CLAVULANATE POTASSIUM 875; 125 MG/1; MG/1
1 TABLET, FILM COATED ORAL EVERY 12 HOURS
Qty: 14 TABLET | Refills: 0 | Status: SHIPPED | OUTPATIENT
Start: 2019-03-31 | End: 2019-04-08

## 2019-03-31 RX ADMIN — SODIUM CHLORIDE 1000 ML: 9 INJECTION, SOLUTION INTRAVENOUS at 20:27

## 2019-03-31 RX ADMIN — LIDOCAINE HYDROCHLORIDE: 20 SOLUTION ORAL; TOPICAL at 20:56

## 2019-04-01 NOTE — ED PROVIDER NOTES
Subjective   53-year-old female presents to the ED with a chief complaint of palpitations.  Patient states that she feels like her heart rate is been elevated for the last 2 days.  She feels like it is racing fast.  The patient states that she is having some discomfort in her upper chest and throat area.  She states that she has allergies and has had some mucus production.  She indicates that she feels like she cannot take a deep breath.  She denies any cough or wheeze.  No fever or chills.  No nausea vomiting diarrhea or abdominal pain.  Patient does indicate that she has been getting over bronchitis.  She also notes that she is currently being treated for urinary tract infection.  No other complaints at this time.            Review of Systems   HENT: Positive for sore throat.    Respiratory: Positive for shortness of breath.    All other systems reviewed and are negative.      Past Medical History:   Diagnosis Date   • Acid reflux    • Arthritis    • Bilateral ovarian cysts    • Breast cancer (CMS/HCC) 2009    RIGHT   • Bronchitis    • Kidney stone    • Migraine headache    • MVP (mitral valve prolapse)    • Osteoporosis    • Piercing     EARS ONLY   • Skin cancer     BASAL CELL ON CHEST, SQUAMOUS CELL RIGHT ARM   • Tinnitus    • Wears glasses     TO READ       Allergies   Allergen Reactions   • Bactrim [Sulfamethoxazole-Trimethoprim] Rash   • Demerol [Meperidine] Nausea And Vomiting   • Oxycodone Other (See Comments)     HEADACHE         Past Surgical History:   Procedure Laterality Date   • BREAST AUGMENTATION Bilateral 2010   • BREAST BIOPSY Right    • COLONOSCOPY  2016   • ENDOSCOPY N/A 2/15/2019    Procedure: ESOPHAGOGASTRODUODENOSCOPY WITH COLD FORCEP BIOPSIES;  Surgeon: Mick Triplett MD;  Location: [facility] ENDOSCOPY;  Service: Gastroenterology   • HYSTERECTOMY      COMPLETE   • MASTECTOMY Bilateral 2009   • SKIN BIOPSY      chest, right arm       Family History   Problem Relation Age of Onset   •  Arthritis Mother    • Migraines Mother    • Prostate cancer Father    • Hypertension Father    • Kidney disease Father    • Obesity Father    • Breast cancer Sister    • Heart disease Maternal Grandmother    • Osteoporosis Maternal Grandmother    • Heart disease Paternal Grandfather    • Tuberculosis Paternal Grandfather        Social History     Socioeconomic History   • Marital status:      Spouse name: Not on file   • Number of children: Not on file   • Years of education: Not on file   • Highest education level: Not on file   Tobacco Use   • Smoking status: Never Smoker   • Smokeless tobacco: Never Used   Substance and Sexual Activity   • Alcohol use: No   • Drug use: No   • Sexual activity: Defer           Objective   Physical Exam   Constitutional: She is oriented to person, place, and time. She appears well-developed and well-nourished. No distress.   HENT:   Head: Normocephalic and atraumatic.   Nose: Nose normal.   Eyes: Conjunctivae and EOM are normal.   Cardiovascular: Regular rhythm.   tachycardia   Pulmonary/Chest: Effort normal and breath sounds normal. No respiratory distress.   Abdominal: Soft. She exhibits no distension. There is no tenderness. There is no guarding.   Musculoskeletal: She exhibits no edema or deformity.   Neurological: She is alert and oriented to person, place, and time. No cranial nerve deficit.   Skin: She is not diaphoretic.   Nursing note and vitals reviewed.      Procedures           ED Course  ED Course as of Mar 31 2317   Sun Mar 31, 2019   2305 EKG interpreted by me.  Sinus rhythm.  Rate of 92.  Shortened TX interval.  No ST segment depression or elevation.  No T wave abnormalities.  Nonspecific EKG  [CG]      ED Course User Index  [CG] Fabiano Ortiz, DO      Presents with palpitations.  Slightly tachycardic.  EKG nonischemic.  Troponin negative.  D-dimer negative.  Pain improved with a GI cocktail patient does have a history of GERD and has been on steroids  recently which may have exacerbated her GERD.  Physical exam otherwise unremarkable.  Patient will be discharged to follow-up as needed.  Review of her urine culture performed at the outpatient clinic indicates that she is not sensitive to nitrofurantoin so her antibiotics switched to Augmentin.  Return precautions given.  Appropriate follow-up given.            [facility]      Final diagnoses:   Palpitations            Fabiano Ortiz, DO  03/31/19 1426

## 2019-04-03 ENCOUNTER — HOSPITAL ENCOUNTER (OUTPATIENT)
Dept: GENERAL RADIOLOGY | Facility: HOSPITAL | Age: 54
Discharge: HOME | End: 2019-04-03
Admitting: INTERNAL MEDICINE

## 2019-04-03 ENCOUNTER — OFFICE VISIT (OUTPATIENT)
Dept: INTERNAL MEDICINE | Facility: CLINIC | Age: 54
End: 2019-04-03

## 2019-04-03 VITALS
OXYGEN SATURATION: 96 % | HEIGHT: 68 IN | SYSTOLIC BLOOD PRESSURE: 137 MMHG | HEART RATE: 94 BPM | RESPIRATION RATE: 16 BRPM | DIASTOLIC BLOOD PRESSURE: 83 MMHG | WEIGHT: 177 LBS | BODY MASS INDEX: 26.83 KG/M2 | TEMPERATURE: 98.3 F

## 2019-04-03 DIAGNOSIS — R00.2 PALPITATIONS: ICD-10-CM

## 2019-04-03 DIAGNOSIS — I10 BENIGN ESSENTIAL HYPERTENSION: Primary | ICD-10-CM

## 2019-04-03 LAB — TSH SERPL DL<=0.005 MIU/L-ACNC: 0.48 MIU/ML (ref 0.47–4.68)

## 2019-04-03 PROCEDURE — 99214 OFFICE O/P EST MOD 30 MIN: CPT | Performed by: INTERNAL MEDICINE

## 2019-04-03 PROCEDURE — 71046 X-RAY EXAM CHEST 2 VIEWS: CPT

## 2019-04-03 RX ORDER — ALPRAZOLAM 0.25 MG/1
0.25 TABLET ORAL NIGHTLY PRN
Qty: 10 TABLET | Refills: 0 | Status: SHIPPED | OUTPATIENT
Start: 2019-04-03 | End: 2019-04-08 | Stop reason: SDUPTHER

## 2019-04-03 RX ORDER — PANTOPRAZOLE SODIUM 40 MG/1
40 TABLET, DELAYED RELEASE ORAL DAILY
Qty: 30 TABLET | Refills: 5 | Status: SHIPPED | OUTPATIENT
Start: 2019-04-03 | End: 2019-05-03

## 2019-04-03 NOTE — PROGRESS NOTES
Subjective   gAatha Carbajal is a 53 y.o. female.     Chief Complaint   Patient presents with   • Hypertension   • Palpitations   • Anxiety       History of Present Illness   Patient is here to follow-up on her blood pressure, she also complains of palpitations, she was seen in the ER on March 31, 2019 and the ER records lab and radiology reports have been reviewed, she states she also has been very anxious and the palpitations are increasing her anxiety and anxiety is increasing her palpitations, she has checked her blood pressure and heart rate at several times at home, she also has a sensation that her throat is getting constricted when she has these episodes.  ER records, labs, radiology report and EKG have all been reviewed today    The following portions of the patient's history were reviewed and updated as appropriate: allergies, current medications, past family history, past medical history, past social history, past surgical history and problem list.    Review of Systems   Constitutional: Negative for appetite change, fatigue and fever.   HENT: Negative for congestion, ear discharge, ear pain, sinus pressure and sore throat.    Eyes: Negative for pain and discharge.   Respiratory: Negative for cough, chest tightness, shortness of breath and wheezing.    Cardiovascular: Negative for chest pain, palpitations and leg swelling.   Gastrointestinal: Negative for abdominal pain, blood in stool, constipation, diarrhea and nausea.   Endocrine: Negative for cold intolerance and heat intolerance.   Genitourinary: Negative for dysuria, flank pain and frequency.   Musculoskeletal: Negative for back pain and joint swelling.   Skin: Negative for color change.   Allergic/Immunologic: Negative for environmental allergies and food allergies.   Neurological: Negative for dizziness, weakness, numbness and headaches.   Hematological: Negative for adenopathy. Does not bruise/bleed easily.   Psychiatric/Behavioral: Negative for  "behavioral problems and dysphoric mood. The patient is nervous/anxious.          Current Outpatient Medications:   •  albuterol sulfate  (90 Base) MCG/ACT inhaler, Inhale 2 puffs Every 4 (Four) Hours As Needed for Wheezing., Disp: 1 inhaler, Rfl: 2  •  amoxicillin-clavulanate (AUGMENTIN) 875-125 MG per tablet, Take 1 tablet by mouth Every 12 (Twelve) Hours for 7 days., Disp: 14 tablet, Rfl: 0  •  calcium citrate-vitamin d (CALCITRATE) 315-250 MG-UNIT tablet tablet, Take 2 tablets by mouth Daily., Disp: , Rfl:   •  linaclotide (LINZESS) 72 MCG capsule capsule, Take 1 capsule by mouth Every Morning Before Breakfast., Disp: 30 capsule, Rfl: 0  •  mometasone (NASONEX) 50 MCG/ACT nasal spray, 2 sprays into the nostril(s) as directed by provider Daily As Needed., Disp: , Rfl:   •  raNITIdine (ZANTAC) 300 MG tablet, Take 1 tablet by mouth Every Night., Disp: 30 tablet, Rfl: 1  •  SUMAtriptan (IMITREX) 100 MG tablet, Take 1 tablet by mouth 1 (One) Time As Needed for Migraine for up to 2 doses., Disp: 9 tablet, Rfl: 6  •  ALPRAZolam (XANAX) 0.25 MG tablet, Take 1 tablet by mouth At Night As Needed for Anxiety., Disp: 10 tablet, Rfl: 0  •  metoprolol tartrate (LOPRESSOR) 25 MG tablet, Take 1 tablet by mouth 2 (Two) Times a Day., Disp: 60 tablet, Rfl: 4  •  pantoprazole (PROTONIX) 40 MG EC tablet, Take 1 tablet by mouth Daily for 30 days. Half hour prior  To breakfast, Disp: 30 tablet, Rfl: 5    Objective     Blood pressure 137/83, pulse 94, temperature 98.3 °F (36.8 °C), resp. rate 16, height 172.7 cm (67.99\"), weight 80.3 kg (177 lb), SpO2 96 %, not currently breastfeeding.    Physical Exam   Constitutional: She is oriented to person, place, and time. She appears well-developed and well-nourished. No distress.   HENT:   Head: Normocephalic and atraumatic.   Right Ear: External ear normal.   Left Ear: External ear normal.   Nose: Nose normal.   Mouth/Throat: Oropharynx is clear and moist.   Eyes: Conjunctivae and EOM " are normal. Pupils are equal, round, and reactive to light.   Neck: Neck supple. No thyromegaly present.   Cardiovascular: Normal rate, regular rhythm and normal heart sounds.   Tachycardia   Pulmonary/Chest: Effort normal and breath sounds normal. No respiratory distress.   Abdominal: Soft. Bowel sounds are normal. She exhibits no distension. There is no tenderness. There is no rebound.   Musculoskeletal: Normal range of motion. She exhibits no edema.   Lymphadenopathy:     She has no cervical adenopathy.   Neurological: She is alert and oriented to person, place, and time.   No gross motor or sensory deficits   Skin: Skin is warm. She is not diaphoretic.   Psychiatric: She has a normal mood and affect.   Nursing note and vitals reviewed.    Patient's Body mass index is 26.92 kg/m². BMI is within normal parameters. No follow-up required..      Results for orders placed or performed in visit on 04/03/19   TSH   Result Value Ref Range    TSH 0.479 0.470 - 4.680 mIU/mL         Assessment/Plan   Agatha was seen today for hypertension, palpitations and anxiety.    Diagnoses and all orders for this visit:    Benign essential hypertension    Palpitations  -     TSH  -     XR Chest PA & Lateral  -     Ambulatory Referral to Cardiology    Other orders  -     pantoprazole (PROTONIX) 40 MG EC tablet; Take 1 tablet by mouth Daily for 30 days. Half hour prior  To breakfast  -     metoprolol tartrate (LOPRESSOR) 25 MG tablet; Take 1 tablet by mouth 2 (Two) Times a Day.  -     ALPRAZolam (XANAX) 0.25 MG tablet; Take 1 tablet by mouth At Night As Needed for Anxiety.      Plan:  1.  Benign essential hypertension: Will start Lopressor 25 mg twice daily, patient advised to monitor her blood pressure and heart rate, low-sodium diet advised, oral hydration  2.  Palpitations: We will obtain labs, chest x-ray and refer patient to cardiology, EKG reviewed  3.  Anxiety: As needed alprazolam       Fawn Fischer MD

## 2019-04-08 ENCOUNTER — OFFICE VISIT (OUTPATIENT)
Dept: INTERNAL MEDICINE | Facility: CLINIC | Age: 54
End: 2019-04-08

## 2019-04-08 VITALS
WEIGHT: 177 LBS | HEIGHT: 68 IN | OXYGEN SATURATION: 98 % | HEART RATE: 72 BPM | DIASTOLIC BLOOD PRESSURE: 76 MMHG | TEMPERATURE: 97.7 F | BODY MASS INDEX: 26.83 KG/M2 | SYSTOLIC BLOOD PRESSURE: 116 MMHG | RESPIRATION RATE: 16 BRPM

## 2019-04-08 DIAGNOSIS — F41.9 ANXIETY: ICD-10-CM

## 2019-04-08 DIAGNOSIS — I10 BENIGN ESSENTIAL HYPERTENSION: Primary | ICD-10-CM

## 2019-04-08 DIAGNOSIS — R06.02 SHORTNESS OF BREATH: ICD-10-CM

## 2019-04-08 DIAGNOSIS — R00.2 PALPITATIONS: ICD-10-CM

## 2019-04-08 PROCEDURE — 99214 OFFICE O/P EST MOD 30 MIN: CPT | Performed by: INTERNAL MEDICINE

## 2019-04-08 RX ORDER — ESCITALOPRAM OXALATE 10 MG/1
10 TABLET ORAL DAILY
Qty: 30 TABLET | Refills: 4 | Status: SHIPPED | OUTPATIENT
Start: 2019-04-08 | End: 2019-08-31 | Stop reason: SDUPTHER

## 2019-04-08 RX ORDER — ALPRAZOLAM 0.25 MG/1
0.25 TABLET ORAL NIGHTLY PRN
Qty: 10 TABLET | Refills: 0 | Status: SHIPPED | OUTPATIENT
Start: 2019-04-08 | End: 2019-04-08 | Stop reason: SDUPTHER

## 2019-04-08 RX ORDER — ALPRAZOLAM 0.25 MG/1
0.25 TABLET ORAL
Qty: 60 TABLET | Refills: 0 | Status: SHIPPED | OUTPATIENT
Start: 2019-04-08 | End: 2019-05-13

## 2019-04-08 NOTE — PROGRESS NOTES
Subjective   Agatha Carbajal is a 53 y.o. female.     Chief Complaint   Patient presents with   • Hypertension   • Anxiety   • Palpitations   • Shortness of Breath       History of Present Illness   HPI: Patient is here to follow up on the blood pressure  The patient is taking the blood pressure medications as prescribed and has had no side effects. The patient is also here to follow up on palpitations, patient states that her heart rate which has improved with lopressor. She also complains of anxiety and was on lexapro and prozac and ? wellbutrin 17 yrs ago , she was recently started on alprazolam  ,  She complains of shortness of breath , she also sees ent and will be doing allergy tests and tests for asthma , she has brought in her home blood pressure  And heart rate readings which have been reviewed with her and her  today .  Hypertension   Pertinent negatives include no chest pain,        The following portions of the patient's history were reviewed and updated as appropriate: allergies, current medications, past family history, past medical history, past social history, past surgical history and problem list.    Review of Systems   Constitutional: Negative for appetite change, fatigue and fever.   HENT: Negative for congestion, ear discharge, ear pain, sinus pressure and sore throat.    Eyes: Negative for pain and discharge.   Respiratory: Positive for shortness of breath. Negative for cough, chest tightness and wheezing.    Cardiovascular: Positive for palpitations. Negative for chest pain and leg swelling.   Gastrointestinal: Negative for abdominal pain, blood in stool, constipation, diarrhea and nausea.   Endocrine: Negative for cold intolerance and heat intolerance.   Genitourinary: Negative for dysuria, flank pain and frequency.   Musculoskeletal: Negative for back pain and joint swelling.   Skin: Negative for color change.   Allergic/Immunologic: Negative for environmental allergies and food  "allergies.   Neurological: Negative for dizziness, weakness, numbness and headaches.   Hematological: Negative for adenopathy. Does not bruise/bleed easily.   Psychiatric/Behavioral: Negative for behavioral problems and dysphoric mood. The patient is nervous/anxious.          Current Outpatient Medications:   •  albuterol sulfate  (90 Base) MCG/ACT inhaler, Inhale 2 puffs Every 4 (Four) Hours As Needed for Wheezing., Disp: 1 inhaler, Rfl: 2  •  ALPRAZolam (XANAX) 0.25 MG tablet, Take 1 tablet by mouth Every 2 (Two) Hours As Needed for Anxiety., Disp: 60 tablet, Rfl: 0  •  calcium citrate-vitamin d (CALCITRATE) 315-250 MG-UNIT tablet tablet, Take 2 tablets by mouth Daily., Disp: , Rfl:   •  linaclotide (LINZESS) 72 MCG capsule capsule, Take 1 capsule by mouth Every Morning Before Breakfast., Disp: 30 capsule, Rfl: 0  •  metoprolol tartrate (LOPRESSOR) 25 MG tablet, Take 1 tablet by mouth 2 (Two) Times a Day., Disp: 60 tablet, Rfl: 4  •  mometasone (NASONEX) 50 MCG/ACT nasal spray, 2 sprays into the nostril(s) as directed by provider Daily As Needed., Disp: , Rfl:   •  pantoprazole (PROTONIX) 40 MG EC tablet, Take 1 tablet by mouth Daily for 30 days. Half hour prior  To breakfast, Disp: 30 tablet, Rfl: 5  •  raNITIdine (ZANTAC) 300 MG tablet, Take 1 tablet by mouth Every Night., Disp: 30 tablet, Rfl: 1  •  SUMAtriptan (IMITREX) 100 MG tablet, Take 1 tablet by mouth 1 (One) Time As Needed for Migraine for up to 2 doses., Disp: 9 tablet, Rfl: 6  •  escitalopram (LEXAPRO) 10 MG tablet, Take 1 tablet by mouth Daily., Disp: 30 tablet, Rfl: 4    Objective     Blood pressure 116/76, pulse 72, temperature 97.7 °F (36.5 °C), resp. rate 16, height 172.7 cm (67.99\"), weight 80.3 kg (177 lb), SpO2 98 %, not currently breastfeeding.    Physical Exam   Constitutional: She is oriented to person, place, and time. She appears well-developed and well-nourished. No distress.   HENT:   Head: Normocephalic and atraumatic.   Right " Ear: External ear normal.   Left Ear: External ear normal.   Nose: Nose normal.   Mouth/Throat: Oropharynx is clear and moist.   Eyes: Conjunctivae and EOM are normal. Pupils are equal, round, and reactive to light.   Neck: Neck supple. No thyromegaly present.   Cardiovascular: Normal rate, regular rhythm and normal heart sounds.   Pulmonary/Chest: Effort normal and breath sounds normal. No respiratory distress.   Abdominal: Soft. Bowel sounds are normal. She exhibits no distension. There is no tenderness. There is no rebound.   Musculoskeletal: Normal range of motion. She exhibits no edema.   Lymphadenopathy:     She has no cervical adenopathy.   Neurological: She is alert and oriented to person, place, and time.   No gross motor or sensory deficits   Skin: Skin is warm. She is not diaphoretic.   Psychiatric: She has a normal mood and affect.   Nursing note and vitals reviewed.    Patient's Body mass index is 26.92 kg/m².        Results for orders placed or performed in visit on 04/03/19   TSH   Result Value Ref Range    TSH 0.479 0.470 - 4.680 mIU/mL         Assessment/Plan   Agatha was seen today for hypertension, anxiety, palpitations and shortness of breath.    Diagnoses and all orders for this visit:    Benign essential hypertension    Anxiety    Shortness of breath  -     Ambulatory Referral to Pulmonology    Palpitations    Other orders  -     escitalopram (LEXAPRO) 10 MG tablet; Take 1 tablet by mouth Daily.  -     Discontinue: ALPRAZolam (XANAX) 0.25 MG tablet; Take 1 tablet by mouth At Night As Needed for Anxiety.  -     ALPRAZolam (XANAX) 0.25 MG tablet; Take 1 tablet by mouth Every 2 (Two) Hours As Needed for Anxiety.      Plan:  1.  Benign essential hypertension: Will continue current medication, low-sodium diet advised  2.  Anxiety : We will give a trial with Lexapro 10 mg daily, as needed alprazolam for a months supply , she will need to sign a contract if she needs refills,   3. Shortness of breath :  We will refer patient to pulmonology, to complete allergy tests with ENT  4.palpitations: We will refer patient to cardiology continue Chucky Fischer MD

## 2019-04-09 ENCOUNTER — TELEPHONE (OUTPATIENT)
Dept: INTERNAL MEDICINE | Facility: CLINIC | Age: 54
End: 2019-04-09

## 2019-04-09 NOTE — TELEPHONE ENCOUNTER
Patient called and states there is a mistake in her xanax instructions please call pharmacy and make changes. She states it currently says to take every two hours but patient states its supposed to say twice daily.

## 2019-04-10 ENCOUNTER — TELEPHONE (OUTPATIENT)
Dept: INTERNAL MEDICINE | Facility: CLINIC | Age: 54
End: 2019-04-10

## 2019-04-10 NOTE — TELEPHONE ENCOUNTER
Patient called with concerns regarding her metoprolol.  She says she has been having some weakness in her legs and it is concerning.  Could you please give her a call when you get a chance.  Thank you.  Phone number verified.

## 2019-04-10 NOTE — TELEPHONE ENCOUNTER
Wants to know if she can take half dose the metoprolol.  States that after taking 2nd dose at night she gets shaky and legs feel weak   Feels better taking medication but having some dizziness has not taken Xanax today    Reports that ENT is testing T3 and T4

## 2019-04-11 NOTE — TELEPHONE ENCOUNTER
Pt stated that her Bp and heart rate are normal Bp of 100/60 and that she is feeling much better  Per Dr. Fischer advised pt to continue Metoprolol 25 mg BID stop all supplements drink plenty of water and each regular meals

## 2019-05-13 ENCOUNTER — OFFICE VISIT (OUTPATIENT)
Dept: INTERNAL MEDICINE | Facility: CLINIC | Age: 54
End: 2019-05-13

## 2019-05-13 VITALS
TEMPERATURE: 97.3 F | OXYGEN SATURATION: 98 % | WEIGHT: 184 LBS | RESPIRATION RATE: 16 BRPM | HEART RATE: 69 BPM | SYSTOLIC BLOOD PRESSURE: 125 MMHG | BODY MASS INDEX: 27.89 KG/M2 | DIASTOLIC BLOOD PRESSURE: 81 MMHG | HEIGHT: 68 IN

## 2019-05-13 DIAGNOSIS — F41.9 ANXIETY: Primary | ICD-10-CM

## 2019-05-13 DIAGNOSIS — R00.2 PALPITATIONS: ICD-10-CM

## 2019-05-13 PROCEDURE — 99213 OFFICE O/P EST LOW 20 MIN: CPT | Performed by: INTERNAL MEDICINE

## 2019-05-13 RX ORDER — AZELASTINE HYDROCHLORIDE 137 UG/1
1 SPRAY, METERED NASAL EVERY 12 HOURS SCHEDULED
COMMUNITY
End: 2020-11-13

## 2019-05-13 NOTE — PROGRESS NOTES
Subjective   Agatha Carbajal is a 53 y.o. female.     Chief Complaint   Patient presents with   • Anxiety   • Palpitations       History of Present Illness   HPI: Patient is here to follow up on palpitations  The patient is also was seen by cardiology dr holley  and had holter and echo done , she also saw ent  And had allergy tests done , she also complains of anxiety and takes lexapro and prn alprazolam ,  She states ent did free t4 level and it was elevated and she sees endocrinology  Dr hanna [facility]     The following portions of the patient's history were reviewed and updated as appropriate: allergies, current medications, past family history, past medical history, past social history, past surgical history and problem list.    Review of Systems   Constitutional: Negative for appetite change, fatigue and fever.   HENT: Negative for congestion, ear discharge, ear pain, sinus pressure and sore throat.    Eyes: Negative for pain and discharge.   Respiratory: Negative for cough, chest tightness, shortness of breath and wheezing.    Cardiovascular: Positive for palpitations. Negative for chest pain and leg swelling.   Gastrointestinal: Negative for abdominal pain, blood in stool, constipation, diarrhea and nausea.   Endocrine: Negative for cold intolerance and heat intolerance.   Genitourinary: Negative for dysuria, flank pain and frequency.   Musculoskeletal: Negative for back pain and joint swelling.   Skin: Negative for color change.   Allergic/Immunologic: Negative for environmental allergies and food allergies.   Neurological: Negative for dizziness, weakness, numbness and headaches.   Hematological: Negative for adenopathy. Does not bruise/bleed easily.   Psychiatric/Behavioral: Negative for behavioral problems and dysphoric mood. The patient is nervous/anxious.          Current Outpatient Medications:   •  albuterol sulfate  (90 Base) MCG/ACT inhaler, Inhale 2 puffs  "Every 4 (Four) Hours As Needed for Wheezing., Disp: 1 inhaler, Rfl: 2  •  calcium citrate-vitamin d (CALCITRATE) 315-250 MG-UNIT tablet tablet, Take 2 tablets by mouth Daily., Disp: , Rfl:   •  escitalopram (LEXAPRO) 10 MG tablet, Take 1 tablet by mouth Daily., Disp: 30 tablet, Rfl: 4  •  mometasone (NASONEX) 50 MCG/ACT nasal spray, 2 sprays into the nostril(s) as directed by provider Daily As Needed., Disp: , Rfl:   •  raNITIdine (ZANTAC) 300 MG tablet, Take 1 tablet by mouth Every Night., Disp: 30 tablet, Rfl: 1  •  SUMAtriptan (IMITREX) 100 MG tablet, Take 1 tablet by mouth 1 (One) Time As Needed for Migraine for up to 2 doses., Disp: 9 tablet, Rfl: 6  •  Azelastine HCl 137 MCG/SPRAY solution, 1 spray into the nostril(s) as directed by provider Every 12 (Twelve) Hours., Disp: , Rfl:     Objective     Blood pressure 125/81, pulse 69, temperature 97.3 °F (36.3 °C), resp. rate 16, height 172.7 cm (67.99\"), weight 83.5 kg (184 lb), SpO2 98 %, not currently breastfeeding.    Physical Exam   Constitutional: She is oriented to person, place, and time. She appears well-developed and well-nourished. No distress.   HENT:   Head: Normocephalic and atraumatic.   Right Ear: External ear normal.   Left Ear: External ear normal.   Nose: Nose normal.   Mouth/Throat: Oropharynx is clear and moist.   Eyes: Conjunctivae and EOM are normal. Pupils are equal, round, and reactive to light.   Neck: Neck supple. No thyromegaly present.   Cardiovascular: Normal rate, regular rhythm and normal heart sounds.   Pulmonary/Chest: Effort normal and breath sounds normal. No respiratory distress.   Abdominal: Soft. Bowel sounds are normal. She exhibits no distension. There is no tenderness. There is no rebound.   Musculoskeletal: Normal range of motion. She exhibits no edema.   Lymphadenopathy:     She has no cervical adenopathy.   Neurological: She is alert and oriented to person, place, and time.   No gross motor or sensory deficits   Skin: " Skin is warm. She is not diaphoretic.   Psychiatric: She has a normal mood and affect.   Nursing note and vitals reviewed.    Patient's Body mass index is 27.98 kg/m². BMI is within normal parameters. No follow-up required..      Results for orders placed or performed in visit on 04/03/19   TSH   Result Value Ref Range    TSH 0.479 0.470 - 4.680 mIU/mL         Assessment/Plan   Agatha was seen today for anxiety and palpitations.    Diagnoses and all orders for this visit:    Anxiety    Palpitations      Plan:  1.Palpitations: Improving, will monitor  2.anxiety disorder: To continue Lexapro, labs reviewed, GO Fischer MD

## 2019-06-02 ENCOUNTER — HOSPITAL ENCOUNTER (EMERGENCY)
Facility: HOSPITAL | Age: 54
Discharge: HOME | End: 2019-06-02
Attending: STUDENT IN AN ORGANIZED HEALTH CARE EDUCATION/TRAINING PROGRAM | Admitting: STUDENT IN AN ORGANIZED HEALTH CARE EDUCATION/TRAINING PROGRAM

## 2019-06-02 ENCOUNTER — APPOINTMENT (OUTPATIENT)
Dept: CT IMAGING | Facility: HOSPITAL | Age: 54
End: 2019-06-02

## 2019-06-02 VITALS
OXYGEN SATURATION: 97 % | HEIGHT: 68 IN | DIASTOLIC BLOOD PRESSURE: 89 MMHG | HEART RATE: 62 BPM | BODY MASS INDEX: 27.77 KG/M2 | SYSTOLIC BLOOD PRESSURE: 126 MMHG | WEIGHT: 183.2 LBS | TEMPERATURE: 98 F | RESPIRATION RATE: 16 BRPM

## 2019-06-02 DIAGNOSIS — R10.9 FLANK PAIN: ICD-10-CM

## 2019-06-02 DIAGNOSIS — N30.91 CYSTITIS WITH HEMATURIA: Primary | ICD-10-CM

## 2019-06-02 LAB
ALBUMIN SERPL-MCNC: 4.4 G/DL (ref 3.5–5)
ALBUMIN/GLOB SERPL: 1.3 G/DL (ref 1–2)
ALP SERPL-CCNC: 99 U/L (ref 38–126)
ALT SERPL W P-5'-P-CCNC: 36 U/L (ref 13–69)
ANION GAP SERPL CALCULATED.3IONS-SCNC: 12.8 MMOL/L (ref 10–20)
AST SERPL-CCNC: 28 U/L (ref 15–46)
BACTERIA UR QL AUTO: ABNORMAL /HPF
BASOPHILS # BLD AUTO: 0.04 10*3/MM3 (ref 0–0.2)
BASOPHILS NFR BLD AUTO: 0.4 % (ref 0–1.5)
BILIRUB SERPL-MCNC: 1.1 MG/DL (ref 0.2–1.3)
BILIRUB UR QL STRIP: NEGATIVE
BUN BLD-MCNC: 10 MG/DL (ref 7–20)
BUN/CREAT SERPL: 20 (ref 7.1–23.5)
CALCIUM SPEC-SCNC: 9.2 MG/DL (ref 8.4–10.2)
CHLORIDE SERPL-SCNC: 101 MMOL/L (ref 98–107)
CLARITY UR: ABNORMAL
CO2 SERPL-SCNC: 26 MMOL/L (ref 26–30)
COLOR UR: YELLOW
CREAT BLD-MCNC: 0.5 MG/DL (ref 0.6–1.3)
DEPRECATED RDW RBC AUTO: 44 FL (ref 37–54)
EOSINOPHIL # BLD AUTO: 0.02 10*3/MM3 (ref 0–0.4)
EOSINOPHIL NFR BLD AUTO: 0.2 % (ref 0.3–6.2)
ERYTHROCYTE [DISTWIDTH] IN BLOOD BY AUTOMATED COUNT: 13.7 % (ref 12.3–15.4)
GFR SERPL CREATININE-BSD FRML MDRD: 130 ML/MIN/1.73
GLOBULIN UR ELPH-MCNC: 3.5 GM/DL
GLUCOSE BLD-MCNC: 103 MG/DL (ref 74–98)
GLUCOSE UR STRIP-MCNC: NEGATIVE MG/DL
HCT VFR BLD AUTO: 40 % (ref 34–46.6)
HGB BLD-MCNC: 12.9 G/DL (ref 12–15.9)
HGB UR QL STRIP.AUTO: ABNORMAL
HYALINE CASTS UR QL AUTO: ABNORMAL /LPF
IMM GRANULOCYTES # BLD AUTO: 0.02 10*3/MM3 (ref 0–0.05)
IMM GRANULOCYTES NFR BLD AUTO: 0.2 % (ref 0–0.5)
KETONES UR QL STRIP: ABNORMAL
LEUKOCYTE ESTERASE UR QL STRIP.AUTO: ABNORMAL
LIPASE SERPL-CCNC: 94 U/L (ref 23–300)
LYMPHOCYTES # BLD AUTO: 1.31 10*3/MM3 (ref 0.7–3.1)
LYMPHOCYTES NFR BLD AUTO: 14 % (ref 19.6–45.3)
MCH RBC QN AUTO: 28.2 PG (ref 26.6–33)
MCHC RBC AUTO-ENTMCNC: 32.3 G/DL (ref 31.5–35.7)
MCV RBC AUTO: 87.3 FL (ref 79–97)
MONOCYTES # BLD AUTO: 0.51 10*3/MM3 (ref 0.1–0.9)
MONOCYTES NFR BLD AUTO: 5.5 % (ref 5–12)
NEUTROPHILS # BLD AUTO: 7.45 10*3/MM3 (ref 1.7–7)
NEUTROPHILS NFR BLD AUTO: 79.7 % (ref 42.7–76)
NITRITE UR QL STRIP: POSITIVE
NRBC BLD AUTO-RTO: 0 /100 WBC (ref 0–0.2)
PH UR STRIP.AUTO: 6 [PH] (ref 5–8)
PLATELET # BLD AUTO: 271 10*3/MM3 (ref 140–450)
PMV BLD AUTO: 10.4 FL (ref 6–12)
POTASSIUM BLD-SCNC: 3.8 MMOL/L (ref 3.5–5.1)
PROT SERPL-MCNC: 7.9 G/DL (ref 6.3–8.2)
PROT UR QL STRIP: ABNORMAL
RBC # BLD AUTO: 4.58 10*6/MM3 (ref 3.77–5.28)
RBC # UR: ABNORMAL /HPF
REF LAB TEST METHOD: ABNORMAL
SODIUM BLD-SCNC: 136 MMOL/L (ref 137–145)
SP GR UR STRIP: 1.01 (ref 1–1.03)
SQUAMOUS #/AREA URNS HPF: ABNORMAL /HPF
UROBILINOGEN UR QL STRIP: ABNORMAL
WBC NRBC COR # BLD: 9.35 10*3/MM3 (ref 3.4–10.8)
WBC UR QL AUTO: ABNORMAL /HPF
YEAST URNS QL MICRO: ABNORMAL /HPF

## 2019-06-02 PROCEDURE — 99284 EMERGENCY DEPT VISIT MOD MDM: CPT

## 2019-06-02 PROCEDURE — 87186 SC STD MICRODIL/AGAR DIL: CPT | Performed by: PHYSICIAN ASSISTANT

## 2019-06-02 PROCEDURE — 96365 THER/PROPH/DIAG IV INF INIT: CPT

## 2019-06-02 PROCEDURE — 74176 CT ABD & PELVIS W/O CONTRAST: CPT

## 2019-06-02 PROCEDURE — 83690 ASSAY OF LIPASE: CPT | Performed by: PHYSICIAN ASSISTANT

## 2019-06-02 PROCEDURE — 87077 CULTURE AEROBIC IDENTIFY: CPT | Performed by: PHYSICIAN ASSISTANT

## 2019-06-02 PROCEDURE — 85025 COMPLETE CBC W/AUTO DIFF WBC: CPT | Performed by: PHYSICIAN ASSISTANT

## 2019-06-02 PROCEDURE — 81001 URINALYSIS AUTO W/SCOPE: CPT | Performed by: PHYSICIAN ASSISTANT

## 2019-06-02 PROCEDURE — 96375 TX/PRO/DX INJ NEW DRUG ADDON: CPT

## 2019-06-02 PROCEDURE — 80053 COMPREHEN METABOLIC PANEL: CPT | Performed by: PHYSICIAN ASSISTANT

## 2019-06-02 PROCEDURE — 25010000002 KETOROLAC TROMETHAMINE PER 15 MG: Performed by: PHYSICIAN ASSISTANT

## 2019-06-02 PROCEDURE — 25010000002 CEFTRIAXONE SODIUM-DEXTROSE 1-3.74 GM-%(50ML) RECONSTITUTED SOLUTION: Performed by: PHYSICIAN ASSISTANT

## 2019-06-02 PROCEDURE — 87086 URINE CULTURE/COLONY COUNT: CPT | Performed by: PHYSICIAN ASSISTANT

## 2019-06-02 RX ORDER — ONDANSETRON 2 MG/ML
4 INJECTION INTRAMUSCULAR; INTRAVENOUS ONCE
Status: DISCONTINUED | OUTPATIENT
Start: 2019-06-02 | End: 2019-06-02 | Stop reason: HOSPADM

## 2019-06-02 RX ORDER — TRAMADOL HYDROCHLORIDE 50 MG/1
50 TABLET ORAL EVERY 8 HOURS PRN
Qty: 6 TABLET | Refills: 0 | Status: SHIPPED | OUTPATIENT
Start: 2019-06-02 | End: 2019-06-04

## 2019-06-02 RX ORDER — CEPHALEXIN 500 MG/1
500 CAPSULE ORAL 2 TIMES DAILY
Qty: 14 CAPSULE | Refills: 0 | Status: SHIPPED | OUTPATIENT
Start: 2019-06-02 | End: 2019-06-09

## 2019-06-02 RX ORDER — SODIUM CHLORIDE 0.9 % (FLUSH) 0.9 %
10 SYRINGE (ML) INJECTION AS NEEDED
Status: DISCONTINUED | OUTPATIENT
Start: 2019-06-02 | End: 2019-06-02 | Stop reason: HOSPADM

## 2019-06-02 RX ORDER — KETOROLAC TROMETHAMINE 30 MG/ML
30 INJECTION, SOLUTION INTRAMUSCULAR; INTRAVENOUS ONCE
Status: COMPLETED | OUTPATIENT
Start: 2019-06-02 | End: 2019-06-02

## 2019-06-02 RX ORDER — CEFTRIAXONE 1 G/50ML
1 INJECTION, SOLUTION INTRAVENOUS ONCE
Status: COMPLETED | OUTPATIENT
Start: 2019-06-02 | End: 2019-06-02

## 2019-06-02 RX ORDER — ONDANSETRON 4 MG/1
4 TABLET, ORALLY DISINTEGRATING ORAL EVERY 6 HOURS PRN
Qty: 8 TABLET | Refills: 0 | Status: SHIPPED | OUTPATIENT
Start: 2019-06-02 | End: 2019-06-04

## 2019-06-02 RX ORDER — HYDROCODONE BITARTRATE AND ACETAMINOPHEN 5; 325 MG/1; MG/1
1 TABLET ORAL ONCE
Status: COMPLETED | OUTPATIENT
Start: 2019-06-02 | End: 2019-06-02

## 2019-06-02 RX ADMIN — HYDROCODONE BITARTRATE AND ACETAMINOPHEN 1 TABLET: 5; 325 TABLET ORAL at 14:52

## 2019-06-02 RX ADMIN — KETOROLAC TROMETHAMINE 30 MG: 30 INJECTION, SOLUTION INTRAMUSCULAR; INTRAVENOUS at 13:11

## 2019-06-02 RX ADMIN — CEFTRIAXONE 1 G: 1 INJECTION, SOLUTION INTRAVENOUS at 14:53

## 2019-06-02 RX ADMIN — SODIUM CHLORIDE 1000 ML: 9 INJECTION, SOLUTION INTRAVENOUS at 13:11

## 2019-06-02 NOTE — DISCHARGE INSTRUCTIONS
Symptoms seem to be due to a urinary tract infection.  There is also evidence that you may have recently passed a kidney stone as well.  Take antibiotic as directed until all medication is complete unless directed by another provider.  Take ondansetron as needed as directed for nausea and vomiting.  Drink plenty of fluid to help stay hydrated.  May take tramadol as directed for severe pain.  May take additional ibuprofen 400 mg every 6 hours as needed.  Will need to follow-up with your primary care provider in the next 1 to 2 days to reevaluate your symptoms.  If symptoms persist or urinary tract infection does not improve, may need to be referred to urologist.  This may happen through your primary care provider or you may call Dr. Vega at number provided.  Return here for any change, worsening of symptoms, or any additional concerns include but limited to severe worsening pain, fever greater than 100.4, intractable vomiting.

## 2019-06-02 NOTE — ED PROVIDER NOTES
Subjective   Patient is a 53-year-old female with a history of acid reflux, ovarian cyst, breast cancer, migraines, kidney stones, and mitral valve prolapse presenting to the ED for evaluation of flank pain.  Patient states that yesterday she began feeling a throbbing right-sided low back pain with intermittent stabbing pains, radiating toward her groin.  She states that she has also had urinary urgency and frequency, mild dysuria at the end of her stream.  She states it feels similar to previous kidney stones.  She states that she had one approximate 25 years ago that she passed on her own.  She states she took ibuprofen at home yesterday but has had no other medications.  She states she has had a mild headache but states she has taken her normal migraine medications and it has improved.  She denies any fever, chills, chest pain, cough, hemoptysis, nausea, emesis, change in bowel movements, pelvic pain, abnormal vaginal discharge, or any other symptoms.            Review of Systems   All other systems reviewed and are negative.      Past Medical History:   Diagnosis Date   • Acid reflux    • Arthritis    • Bilateral ovarian cysts    • Breast cancer (CMS/HCC) 2009    RIGHT   • Bronchitis    • Kidney stone    • Migraine headache    • MVP (mitral valve prolapse)    • Osteoporosis    • Piercing     EARS ONLY   • Skin cancer     BASAL CELL ON CHEST, SQUAMOUS CELL RIGHT ARM   • Tinnitus    • Wears glasses     TO READ       Allergies   Allergen Reactions   • Bactrim [Sulfamethoxazole-Trimethoprim] Rash   • Demerol [Meperidine] Nausea And Vomiting   • Oxycodone Other (See Comments)     HEADACHE         Past Surgical History:   Procedure Laterality Date   • BREAST AUGMENTATION Bilateral 2010   • BREAST BIOPSY Right    • COLONOSCOPY  2016   • ENDOSCOPY N/A 2/15/2019    Procedure: ESOPHAGOGASTRODUODENOSCOPY WITH COLD FORCEP BIOPSIES;  Surgeon: Mick Triplett MD;  Location: [facility] ENDOSCOPY;  Service: Gastroenterology   •  HYSTERECTOMY      COMPLETE   • MASTECTOMY Bilateral 2009   • SKIN BIOPSY      chest, right arm       Family History   Problem Relation Age of Onset   • Arthritis Mother    • Migraines Mother    • Prostate cancer Father    • Hypertension Father    • Kidney disease Father    • Obesity Father    • Breast cancer Sister    • Heart disease Maternal Grandmother    • Osteoporosis Maternal Grandmother    • Heart disease Paternal Grandfather    • Tuberculosis Paternal Grandfather        Social History     Socioeconomic History   • Marital status:      Spouse name: Not on file   • Number of children: Not on file   • Years of education: Not on file   • Highest education level: Not on file   Tobacco Use   • Smoking status: Never Smoker   • Smokeless tobacco: Never Used   Substance and Sexual Activity   • Alcohol use: No   • Drug use: No   • Sexual activity: Defer           Objective   Physical Exam   Nursing note and vitals reviewed.    GEN: Pleasant, well appearing female, No acute distress, sitting upright the stretcher.  She is awake and alert.  She does not appear toxic.  Head: Normocephalic, atraumatic.  Eyes: EOM intact  ENT: Posterior pharynx normal in appearance, oral mucosa is moist, tongue midline  Chest: Nontender to palpation  Cardiovascular: Regular rate and rhythm  Lungs: Clear to auscultation bilaterally  Abdomen: Soft, nontender, nondistended, no peritoneal signs or guarding.   Back: No CVA tenderness bilaterally.  Extremities: No edema, normal appearance, full ROM without deficits.  Neuro: GCS 15  Psych: Mood and affect are appropriate    Procedures           ED Course  ED Course as of Jun 02 1536   Sun Jun 02, 2019   1347 RBC, UA: (!) 13-20 [LA]   1347 WBC, UA: (!) 31-50 [LA]   1347 Nitrite, UA: (!) Positive [LA]   1347 Leukocytes, UA: (!) Large (3+) [LA]   1347 Blood, UA: (!) Large (3+) [LA]   1418 Impression     Mild right hydronephrosis and hydroureter but without a definite  renal stone.  The  differential diagnosis would include a  recently passed stone or a small nonvisualized stone.  No mass  or inflammatory process.    Authenticated by Subhash Hayden III, MD on 06/02/2019  02:17:36 PM      [LA]   1432 Discussed findings with patient.  She states that the pain is still present.  Will treat infection with ceftriaxone and Keflex.  Will give additional pain control here and discharged home with short course of tramadol.   [LA]      ED Course User Index  [LA] Beatriz Gonzalez PA-C                  MDM  Number of Diagnoses or Management Options  Cystitis with hematuria:   Flank pain:   Diagnosis management comments: On arrival, patient is in no acute distress, nontoxic in appearance.  Differential includes nephrolithiasis, urinary tract infection, pyelonephritis, other concerns.  Lower concern for cholecystitis, neoplasm, ovarian cyst, ovarian torsion, pneumonia.  Will obtain basic labs to assess kidney function, CT of the abdomen to rule out kidney stone, urinalysis.  Will give pain and nausea medication with IV fluids and reassess.    CBC, CMP and lipase unremarkable.  Urine does show signs of infection with hematuria.  CT scan reveals mild hydroureter and hydronephrosis but no obvious stone which could be from a recently passed stone.  Discussed this with patient.  Treated UTI with 1 dose of Rocephin and will send home on p.o. Keflex.  Urine was sent for culture as well.  We will also sent home with a short dose of tramadol as well as Zofran.  Discussed follow-up with PCP and urologist, as well as strict return precautions.  Patient verbalized understanding was in agreement this plan of care.       Amount and/or Complexity of Data Reviewed  Clinical lab tests: reviewed and ordered  Tests in the radiology section of CPT®: reviewed and ordered  Review and summarize past medical records: yes  Discuss the patient with other providers: yes  Independent visualization of images, tracings, or specimens:  yes    Risk of Complications, Morbidity, and/or Mortality  Presenting problems: moderate  Diagnostic procedures: moderate  Management options: moderate    Patient Progress  Patient progress: stable        Final diagnoses:   Cystitis with hematuria   Flank pain            Beatriz Gonzalez PA-C  06/02/19 1534

## 2019-06-04 LAB — BACTERIA SPEC AEROBE CULT: ABNORMAL

## 2019-09-03 RX ORDER — ESCITALOPRAM OXALATE 10 MG/1
TABLET ORAL
Qty: 30 TABLET | Refills: 3 | Status: SHIPPED | OUTPATIENT
Start: 2019-09-03 | End: 2019-09-25

## 2019-09-09 ENCOUNTER — OFFICE VISIT (OUTPATIENT)
Dept: INTERNAL MEDICINE | Facility: CLINIC | Age: 54
End: 2019-09-09

## 2019-09-09 VITALS
BODY MASS INDEX: 28.34 KG/M2 | DIASTOLIC BLOOD PRESSURE: 81 MMHG | HEART RATE: 71 BPM | SYSTOLIC BLOOD PRESSURE: 122 MMHG | OXYGEN SATURATION: 98 % | RESPIRATION RATE: 16 BRPM | TEMPERATURE: 97.1 F | HEIGHT: 68 IN | WEIGHT: 187 LBS

## 2019-09-09 DIAGNOSIS — Z00.00 ROUTINE GENERAL MEDICAL EXAMINATION AT A HEALTH CARE FACILITY: Primary | ICD-10-CM

## 2019-09-09 PROCEDURE — 99396 PREV VISIT EST AGE 40-64: CPT | Performed by: INTERNAL MEDICINE

## 2019-09-09 RX ORDER — SUMATRIPTAN 100 MG/1
100 TABLET, FILM COATED ORAL ONCE AS NEEDED
Qty: 9 TABLET | Refills: 6 | Status: SHIPPED | OUTPATIENT
Start: 2019-09-09 | End: 2020-09-15

## 2019-09-09 NOTE — PROGRESS NOTES
Subjective   Agatha Carbajal is a 54 y.o. female.     Chief Complaint   Patient presents with   • Annual Exam       History of Present Illness    patient is here for a physical  And due for labs , she  Has brought in paperwork for her physical today    Review of Systems   Constitutional: Negative for appetite change, fatigue and fever.   HENT: Negative for congestion, ear discharge, ear pain, sinus pressure and sore throat.    Eyes: Negative for pain and discharge.   Respiratory: Negative for cough, chest tightness, shortness of breath and wheezing.    Cardiovascular: Negative for chest pain, palpitations and leg swelling.   Gastrointestinal: Negative for abdominal pain, blood in stool, constipation, diarrhea and nausea.   Endocrine: Negative for cold intolerance and heat intolerance.   Genitourinary: Negative for dysuria, flank pain and frequency.   Musculoskeletal: Negative for back pain and joint swelling.   Skin: Negative for color change.   Allergic/Immunologic: Negative for environmental allergies and food allergies.   Neurological: Negative for dizziness, weakness, numbness and headaches.   Hematological: Negative for adenopathy. Does not bruise/bleed easily.   Psychiatric/Behavioral: Negative for behavioral problems and dysphoric mood. The patient is not nervous/anxious.        Past Medical History:   Diagnosis Date   • Acid reflux    • Arthritis    • Bilateral ovarian cysts    • Breast cancer (CMS/HCC) 2009    RIGHT   • Bronchitis    • Kidney stone    • Migraine headache    • MVP (mitral valve prolapse)    • Osteoporosis    • Piercing     EARS ONLY   • Skin cancer     BASAL CELL ON CHEST, SQUAMOUS CELL RIGHT ARM   • Tinnitus    • Wears glasses     TO READ       Past Surgical History:   Procedure Laterality Date   • BREAST AUGMENTATION Bilateral 2010   • BREAST BIOPSY Right    • COLONOSCOPY  2016   • ENDOSCOPY N/A 2/15/2019    Procedure: ESOPHAGOGASTRODUODENOSCOPY WITH COLD FORCEP BIOPSIES;  Surgeon: Ioana  Mick COLEMAN MD;  Location: [facility] ENDOSCOPY;  Service: Gastroenterology   • HYSTERECTOMY      COMPLETE   • MASTECTOMY Bilateral 2009   • SKIN BIOPSY      chest, right arm       Family History   Problem Relation Age of Onset   • Arthritis Mother    • Migraines Mother    • Prostate cancer Father    • Hypertension Father    • Kidney disease Father    • Obesity Father    • Breast cancer Sister    • Heart disease Maternal Grandmother    • Osteoporosis Maternal Grandmother    • Heart disease Paternal Grandfather    • Tuberculosis Paternal Grandfather         reports that she has never smoked. She has never used smokeless tobacco. She reports that she does not drink alcohol or use drugs.    Allergies   Allergen Reactions   • Bactrim [Sulfamethoxazole-Trimethoprim] Rash   • Demerol [Meperidine] Nausea And Vomiting   • Oxycodone Other (See Comments)     HEADACHE             Current Outpatient Medications:   •  albuterol sulfate  (90 Base) MCG/ACT inhaler, Inhale 2 puffs Every 4 (Four) Hours As Needed for Wheezing., Disp: 1 inhaler, Rfl: 2  •  Azelastine HCl 137 MCG/SPRAY solution, 1 spray into the nostril(s) as directed by provider Every 12 (Twelve) Hours., Disp: , Rfl:   •  calcium citrate-vitamin d (CALCITRATE) 315-250 MG-UNIT tablet tablet, Take 2 tablets by mouth Daily., Disp: , Rfl:   •  escitalopram (LEXAPRO) 10 MG tablet, TAKE 1 TABLET BY MOUTH ONE TIME A DAY , Disp: 30 tablet, Rfl: 3  •  mometasone (NASONEX) 50 MCG/ACT nasal spray, 2 sprays into the nostril(s) as directed by provider Daily As Needed., Disp: , Rfl:   •  raNITIdine (ZANTAC) 300 MG tablet, Take 1 tablet by mouth Every Night., Disp: 30 tablet, Rfl: 1  •  SUMAtriptan (IMITREX) 100 MG tablet, Take 1 tablet by mouth 1 (One) Time As Needed for Migraine for up to 2 doses., Disp: 9 tablet, Rfl: 6  •  conjugated estrogens (PREMARIN) 0.625 MG/GM vaginal cream, Use pea sized in affected area twice a week, Disp: 30 g, Rfl: 1      Objective   Blood pressure  "122/81, pulse 71, temperature 97.1 °F (36.2 °C), resp. rate 16, height 172.7 cm (67.99\"), weight 84.8 kg (187 lb), SpO2 98 %, not currently breastfeeding.    Physical Exam   Constitutional: She is oriented to person, place, and time. She appears well-developed and well-nourished. No distress.   HENT:   Head: Normocephalic and atraumatic.   Right Ear: External ear normal.   Left Ear: External ear normal.   Nose: Nose normal.   Mouth/Throat: Oropharynx is clear and moist.   Eyes: Conjunctivae and EOM are normal. Pupils are equal, round, and reactive to light.   Neck: Neck supple. No thyromegaly present.   Cardiovascular: Normal rate, regular rhythm and normal heart sounds.   Pulmonary/Chest: Effort normal and breath sounds normal. No respiratory distress.   Abdominal: Soft. Bowel sounds are normal. She exhibits no distension. There is no tenderness. There is no rebound.   Musculoskeletal: Normal range of motion. She exhibits no edema.   Lymphadenopathy:     She has no cervical adenopathy.   Neurological: She is alert and oriented to person, place, and time.   No gross motor or sensory deficits   Skin: Skin is warm. She is not diaphoretic.   Psychiatric: She has a normal mood and affect.   Nursing note and vitals reviewed.      Patient's Body mass index is 28.44 kg/m². BMI is within normal parameters. No follow-up required..      Results for orders placed or performed during the hospital encounter of 06/02/19   Urine Culture - Urine, Urine, Clean Catch   Result Value Ref Range    Urine Culture >100,000 CFU/mL Escherichia coli (A)        Susceptibility    Escherichia coli - LOLITA     Amikacin <=16 Susceptible ug/ml     Ampicillin >16 Resistant ug/ml     Ampicillin + Sulbactam >16/8 Resistant ug/ml     Aztreonam <=4 Susceptible ug/ml     Cefazolin <=8 Susceptible ug/ml     Cefepime <=4 Susceptible ug/ml     Cefotaxime <=2 Susceptible ug/ml     Cefoxitin <=8 Susceptible ug/ml     Ceftazidime <=1 Susceptible ug/ml     " Ceftriaxone <=8 Susceptible ug/ml     Cefuroxime sodium <=4 Susceptible ug/ml     Ciprofloxacin <=1 Susceptible ug/ml     Doripenem <=0.5 Susceptible ug/ml     Ertapenem <=1 Susceptible ug/ml     Gentamicin <=4 Susceptible ug/ml     Levofloxacin <=2 Susceptible ug/ml     Nitrofurantoin <=32 Susceptible ug/ml     Piperacillin + Tazobactam <=16 Susceptible ug/ml     Tetracycline <=4 Susceptible ug/ml     Tigecycline <=1 Susceptible ug/ml     Tobramycin <=4 Susceptible ug/ml     Trimethoprim + Sulfamethoxazole <=2/38 Susceptible ug/ml   Comprehensive Metabolic Panel   Result Value Ref Range    Glucose 103 (H) 74 - 98 mg/dL    BUN 10 7 - 20 mg/dL    Creatinine 0.50 (L) 0.60 - 1.30 mg/dL    Sodium 136 (L) 137 - 145 mmol/L    Potassium 3.8 3.5 - 5.1 mmol/L    Chloride 101 98 - 107 mmol/L    CO2 26.0 26.0 - 30.0 mmol/L    Calcium 9.2 8.4 - 10.2 mg/dL    Total Protein 7.9 6.3 - 8.2 g/dL    Albumin 4.40 3.50 - 5.00 g/dL    ALT (SGPT) 36 13 - 69 U/L    AST (SGOT) 28 15 - 46 U/L    Alkaline Phosphatase 99 38 - 126 U/L    Total Bilirubin 1.1 0.2 - 1.3 mg/dL    eGFR Non African Amer 130 >60 mL/min/1.73    Globulin 3.5 gm/dL    A/G Ratio 1.3 1.0 - 2.0 g/dL    BUN/Creatinine Ratio 20.0 7.1 - 23.5    Anion Gap 12.8 10.0 - 20.0 mmol/L   Lipase   Result Value Ref Range    Lipase 94 23 - 300 U/L   Urinalysis With Microscopic If Indicated (No Culture) - Urine, Clean Catch   Result Value Ref Range    Color, UA Yellow Yellow, Straw    Appearance, UA Turbid (A) Clear    pH, UA 6.0 5.0 - 8.0    Specific Gravity, UA 1.013 1.005 - 1.030    Glucose, UA Negative Negative    Ketones, UA 15 mg/dL (1+) (A) Negative    Bilirubin, UA Negative Negative    Blood, UA Large (3+) (A) Negative    Protein,  mg/dL (2+) (A) Negative    Leuk Esterase, UA Large (3+) (A) Negative    Nitrite, UA Positive (A) Negative    Urobilinogen, UA 0.2 E.U./dL 0.2 - 1.0 E.U./dL   CBC Auto Differential   Result Value Ref Range    WBC 9.35 3.40 - 10.80 10*3/mm3    RBC  4.58 3.77 - 5.28 10*6/mm3    Hemoglobin 12.9 12.0 - 15.9 g/dL    Hematocrit 40.0 34.0 - 46.6 %    MCV 87.3 79.0 - 97.0 fL    MCH 28.2 26.6 - 33.0 pg    MCHC 32.3 31.5 - 35.7 g/dL    RDW 13.7 12.3 - 15.4 %    RDW-SD 44.0 37.0 - 54.0 fl    MPV 10.4 6.0 - 12.0 fL    Platelets 271 140 - 450 10*3/mm3    Neutrophil % 79.7 (H) 42.7 - 76.0 %    Lymphocyte % 14.0 (L) 19.6 - 45.3 %    Monocyte % 5.5 5.0 - 12.0 %    Eosinophil % 0.2 (L) 0.3 - 6.2 %    Basophil % 0.4 0.0 - 1.5 %    Immature Grans % 0.2 0.0 - 0.5 %    Neutrophils, Absolute 7.45 (H) 1.70 - 7.00 10*3/mm3    Lymphocytes, Absolute 1.31 0.70 - 3.10 10*3/mm3    Monocytes, Absolute 0.51 0.10 - 0.90 10*3/mm3    Eosinophils, Absolute 0.02 0.00 - 0.40 10*3/mm3    Basophils, Absolute 0.04 0.00 - 0.20 10*3/mm3    Immature Grans, Absolute 0.02 0.00 - 0.05 10*3/mm3    nRBC 0.0 0.0 - 0.2 /100 WBC   Urinalysis, Microscopic Only - Urine, Clean Catch   Result Value Ref Range    RBC, UA 13-20 (A) None Seen /HPF    WBC, UA 31-50 (A) None Seen /HPF    Bacteria, UA 1+ (A) None Seen /HPF    Squamous Epithelial Cells, UA 3-6 (A) None Seen, 0-2 /HPF    Yeast, UA Small/1+ Yeast None Seen /HPF    Hyaline Casts, UA None Seen None Seen /LPF    Methodology Manual Light Microscopy          Assessment/Plan   Agatha was seen today for annual exam.    Diagnoses and all orders for this visit:    Routine general medical examination at a health care facility  -     CBC & Differential  -     Comprehensive Metabolic Panel  -     Lipid Panel    Other orders  -     conjugated estrogens (PREMARIN) 0.625 MG/GM vaginal cream; Use pea sized in affected area twice a week  -     SUMAtriptan (IMITREX) 100 MG tablet; Take 1 tablet by mouth 1 (One) Time As Needed for Migraine for up to 2 doses.    plan:  1.physical: Physical exam conducted today,  Will obtain fasting lab work,   Impression: healthy adult Patient. Currently, patient eats a healthy diet. Screening lab work includes glucose, lipid profile , complete  blood count and comprehensive panel . The risks and benefits of immunizations were discussed and immunizations are up to date. Advice and education were given regarding nutrition and aerobic exercise. Patient discussion: discussed with the patient.  Physical with preventive exam as well as age and risk appropriate counseling completed.   Patient Discussion: plan discussed with the patient, follow-up visit needed in one year. Medication Review and medication list updated  Advised Monthly self breast assessment and annual breast imaging and Calcium, 600 mg/ Vit. D, 400 IU daily; regular weight-bearing exercise               Fawn Fischer MD

## 2019-09-12 LAB
ALBUMIN SERPL-MCNC: 4.4 G/DL (ref 3.5–5.2)
ALBUMIN/GLOB SERPL: 1.8 G/DL
ALP SERPL-CCNC: 101 U/L (ref 39–117)
ALT SERPL-CCNC: 14 U/L (ref 1–33)
AST SERPL-CCNC: 18 U/L (ref 1–32)
BASOPHILS # BLD AUTO: 0.05 10*3/MM3 (ref 0–0.2)
BASOPHILS NFR BLD AUTO: 1 % (ref 0–1.5)
BILIRUB SERPL-MCNC: 0.5 MG/DL (ref 0.2–1.2)
BUN SERPL-MCNC: 13 MG/DL (ref 6–20)
BUN/CREAT SERPL: 17.1 (ref 7–25)
CALCIUM SERPL-MCNC: 9.2 MG/DL (ref 8.6–10.5)
CHLORIDE SERPL-SCNC: 103 MMOL/L (ref 98–107)
CHOLEST SERPL-MCNC: 206 MG/DL (ref 0–200)
CO2 SERPL-SCNC: 26.1 MMOL/L (ref 22–29)
CREAT SERPL-MCNC: 0.76 MG/DL (ref 0.57–1)
EOSINOPHIL # BLD AUTO: 0.18 10*3/MM3 (ref 0–0.4)
EOSINOPHIL NFR BLD AUTO: 3.4 % (ref 0.3–6.2)
ERYTHROCYTE [DISTWIDTH] IN BLOOD BY AUTOMATED COUNT: 14.3 % (ref 12.3–15.4)
GLOBULIN SER CALC-MCNC: 2.5 GM/DL
GLUCOSE SERPL-MCNC: 99 MG/DL (ref 65–99)
HCT VFR BLD AUTO: 40.7 % (ref 34–46.6)
HDLC SERPL-MCNC: 69 MG/DL (ref 40–60)
HGB BLD-MCNC: 12.6 G/DL (ref 12–15.9)
IMM GRANULOCYTES # BLD AUTO: 0.02 10*3/MM3 (ref 0–0.05)
IMM GRANULOCYTES NFR BLD AUTO: 0.4 % (ref 0–0.5)
LDLC SERPL CALC-MCNC: 119 MG/DL (ref 0–100)
LYMPHOCYTES # BLD AUTO: 1.75 10*3/MM3 (ref 0.7–3.1)
LYMPHOCYTES NFR BLD AUTO: 33.3 % (ref 19.6–45.3)
MCH RBC QN AUTO: 27.8 PG (ref 26.6–33)
MCHC RBC AUTO-ENTMCNC: 31 G/DL (ref 31.5–35.7)
MCV RBC AUTO: 89.8 FL (ref 79–97)
MONOCYTES # BLD AUTO: 0.35 10*3/MM3 (ref 0.1–0.9)
MONOCYTES NFR BLD AUTO: 6.7 % (ref 5–12)
NEUTROPHILS # BLD AUTO: 2.9 10*3/MM3 (ref 1.7–7)
NEUTROPHILS NFR BLD AUTO: 55.2 % (ref 42.7–76)
NRBC BLD AUTO-RTO: 0 /100 WBC (ref 0–0.2)
PLATELET # BLD AUTO: 288 10*3/MM3 (ref 140–450)
POTASSIUM SERPL-SCNC: 4.4 MMOL/L (ref 3.5–5.2)
PROT SERPL-MCNC: 6.9 G/DL (ref 6–8.5)
RBC # BLD AUTO: 4.53 10*6/MM3 (ref 3.77–5.28)
SODIUM SERPL-SCNC: 142 MMOL/L (ref 136–145)
TRIGL SERPL-MCNC: 88 MG/DL (ref 0–150)
VLDLC SERPL CALC-MCNC: 17.6 MG/DL
WBC # BLD AUTO: 5.25 10*3/MM3 (ref 3.4–10.8)

## 2019-09-25 RX ORDER — ESTRADIOL 0.1 MG/G
CREAM VAGINAL
Qty: 42.5 G | Refills: 5 | Status: SHIPPED | OUTPATIENT
Start: 2019-09-25 | End: 2020-11-13

## 2019-09-25 RX ORDER — SERTRALINE HYDROCHLORIDE 25 MG/1
25 TABLET, FILM COATED ORAL DAILY
Qty: 30 TABLET | Refills: 5 | Status: SHIPPED | OUTPATIENT
Start: 2019-09-25 | End: 2020-11-13

## 2020-09-15 RX ORDER — SUMATRIPTAN 100 MG/1
TABLET, FILM COATED ORAL
Qty: 9 TABLET | Refills: 0 | Status: SHIPPED | OUTPATIENT
Start: 2020-09-15 | End: 2020-11-13 | Stop reason: SDUPTHER

## 2020-11-12 RX ORDER — SUMATRIPTAN 100 MG/1
TABLET, FILM COATED ORAL
Qty: 9 TABLET | Refills: 0 | OUTPATIENT
Start: 2020-11-12

## 2020-11-13 ENCOUNTER — TELEMEDICINE (OUTPATIENT)
Dept: INTERNAL MEDICINE | Facility: CLINIC | Age: 55
End: 2020-11-13

## 2020-11-13 DIAGNOSIS — G43.109 MIGRAINE WITH AURA AND WITHOUT STATUS MIGRAINOSUS, NOT INTRACTABLE: Primary | ICD-10-CM

## 2020-11-13 DIAGNOSIS — F41.9 ANXIETY: ICD-10-CM

## 2020-11-13 PROBLEM — Z85.3 HISTORY OF MALIGNANT NEOPLASM OF BREAST: Status: ACTIVE | Noted: 2017-08-09

## 2020-11-13 PROCEDURE — 99214 OFFICE O/P EST MOD 30 MIN: CPT | Performed by: NURSE PRACTITIONER

## 2020-11-13 RX ORDER — BUSPIRONE HYDROCHLORIDE 5 MG/1
TABLET ORAL
Qty: 90 TABLET | Refills: 1 | Status: SHIPPED | OUTPATIENT
Start: 2020-11-13 | End: 2021-04-05

## 2020-11-13 RX ORDER — ASCORBIC ACID 100 MG
TABLET,CHEWABLE ORAL DAILY
COMMUNITY

## 2020-11-13 RX ORDER — SUMATRIPTAN 100 MG/1
100 TABLET, FILM COATED ORAL ONCE AS NEEDED
Qty: 9 TABLET | Refills: 11 | Status: SHIPPED | OUTPATIENT
Start: 2020-11-13 | End: 2021-04-05 | Stop reason: SDUPTHER

## 2020-11-13 RX ORDER — LORATADINE AND PSEUDOEPHEDRINE SULFATE 10; 240 MG/1; MG/1
TABLET, EXTENDED RELEASE ORAL
COMMUNITY
End: 2021-02-23

## 2020-11-13 NOTE — PROGRESS NOTES
"Date: 2020    Name: Agatha Carbajal  : 1965    Chief Complaint:   Chief Complaint   Patient presents with   • Med Refill     on imitrex       HPI:  Agatha Carbajal is a 55 y.o. female presents for follow up of migraines.  She is requesting a refill of imitrex, as it is effective when she has migraines.  Headaches usually occur with barometric pressure changes.  Takes 1/2-1 tablet, does not normally have to take it again.  She occasionally notes vision changes, describes it as \"looking through gasoline fumes\".  Tinnitus increases occasionally.  She is also interested in medication for anxiety.  States she was prescribed SSRI in the past, gained 10 pounds within a few weeks.  She is experiencing increased anxiety over the past few months.  Has difficulty staying asleep.  Has tried melatonin, Benadryl.  Neither works.  She has taken trazodone in the past, which is effective but increases frequency and intensity of migraines.  Denies depression, feelings of worthlessness or sadness, SI, HI, racing thoughts, decreased concentration, palpitations, diaphoresis.      History: The following portions of the patient's history were reviewed and updated as appropriate: allergies, current medications, past medical history, family history, surgical history, social history and problem list.      ROS:  Review of Systems   Constitutional: Negative.    Respiratory: Negative.    Cardiovascular: Negative.    Musculoskeletal: Negative for myalgias.   Skin: Negative for pallor and rash.   Neurological: Negative for dizziness, facial asymmetry and speech difficulty.     PE:  Physical Exam  Constitutional:       Appearance: She is not ill-appearing.   HENT:      Head: Normocephalic.      Right Ear: External ear normal.      Left Ear: External ear normal.   Eyes:      Conjunctiva/sclera: Conjunctivae normal.      Pupils: Pupils are equal, round, and reactive to light.   Neck:      Musculoskeletal: Normal range of motion and neck " supple.   Pulmonary:      Effort: Pulmonary effort is normal.   Skin:     Coloration: Skin is not pale.      Findings: No erythema.   Neurological:      Mental Status: She is alert and oriented to person, place, and time.      Coordination: Coordination normal.   Psychiatric:         Attention and Perception: Attention normal.         Speech: Speech normal.         Behavior: Behavior normal.         Assessment/Plan:  Diagnoses and all orders for this visit:    1. Migraine with aura and without status migrainosus, not intractable (Primary)  -     SUMAtriptan (IMITREX) 100 MG tablet; Take 1 tablet by mouth 1 (One) Time As Needed for Migraine. Take one tablet at onset of headache. May repeat dose one time in 2 hours if headache not relieved.  Dispense: 9 tablet; Refill: 11  - Avoid known triggers when possible    2. Anxiety  -     busPIRone (BUSPAR) 5 MG tablet; Take one tablet at bedtime. May take twice more throughout the day as needed for anxiety  Dispense: 90 tablet; Refill: 1.  Advised may cause weight gain, as well as other side effects.        - Encouraged to take part in daily physical exercise.          - Eat healthy, well balanced diet; avoid sugary foods or beverages        - Limit alcohol intake        - Ensure good night's sleep by creating calm space in bedroom, avoiding screen time 1-2 hours before bed, no caffeine after 5 pm        - Talk to supportive family and friends, as needed      Return for Annual, in 30 days to assess effectiveness of BuSpar.

## 2020-11-13 NOTE — PROGRESS NOTES
You have chosen to receive care through a telehealth visit.  Do you consent to use a video/audio connection for your medical care today? Yes    Active Parties: Alison YOU, Patrick Rowley and patient

## 2020-12-18 ENCOUNTER — TELEPHONE (OUTPATIENT)
Dept: INTERNAL MEDICINE | Facility: CLINIC | Age: 55
End: 2020-12-18

## 2020-12-18 NOTE — TELEPHONE ENCOUNTER
PHONE CALL FROM PATIENT TO REFILL TRAZADONE AS NEEDED FOR SLEEP.     PHARMACY:  [facility]    PLEASE CALL [phone]

## 2020-12-19 NOTE — TELEPHONE ENCOUNTER
Trazadone not mentioned in pt's chart anywhere---active or inactive meds.  Called pt to inquire about message. She states she made a mistake and Dr. Avila prescribed this for her in the past, she has reached out to his office for this refill.

## 2021-01-12 ENCOUNTER — IMMUNIZATION (OUTPATIENT)
Dept: VACCINE CLINIC | Facility: HOSPITAL | Age: 56
End: 2021-01-12

## 2021-01-12 PROCEDURE — 0011A: CPT | Performed by: INTERNAL MEDICINE

## 2021-01-12 PROCEDURE — 91301 HC SARSCO02 VAC 100MCG/0.5ML IM: CPT | Performed by: INTERNAL MEDICINE

## 2021-02-09 ENCOUNTER — IMMUNIZATION (OUTPATIENT)
Dept: VACCINE CLINIC | Facility: HOSPITAL | Age: 56
End: 2021-02-09

## 2021-02-09 PROCEDURE — 91301 HC SARSCO02 VAC 100MCG/0.5ML IM: CPT | Performed by: INTERNAL MEDICINE

## 2021-02-09 PROCEDURE — 0012A: CPT | Performed by: INTERNAL MEDICINE

## 2021-02-23 ENCOUNTER — OFFICE VISIT (OUTPATIENT)
Dept: INTERNAL MEDICINE | Facility: CLINIC | Age: 56
End: 2021-02-23

## 2021-02-23 VITALS
OXYGEN SATURATION: 97 % | SYSTOLIC BLOOD PRESSURE: 125 MMHG | HEART RATE: 80 BPM | RESPIRATION RATE: 16 BRPM | BODY MASS INDEX: 28.04 KG/M2 | WEIGHT: 185 LBS | TEMPERATURE: 97.5 F | DIASTOLIC BLOOD PRESSURE: 83 MMHG | HEIGHT: 68 IN

## 2021-02-23 DIAGNOSIS — F41.9 ANXIETY: ICD-10-CM

## 2021-02-23 DIAGNOSIS — F51.04 INSOMNIA, PSYCHOPHYSIOLOGICAL: ICD-10-CM

## 2021-02-23 DIAGNOSIS — Z00.00 ROUTINE GENERAL MEDICAL EXAMINATION AT A HEALTH CARE FACILITY: Primary | ICD-10-CM

## 2021-02-23 PROCEDURE — 99396 PREV VISIT EST AGE 40-64: CPT | Performed by: INTERNAL MEDICINE

## 2021-02-23 RX ORDER — TRAZODONE HYDROCHLORIDE 100 MG/1
100 TABLET ORAL
COMMUNITY
Start: 2020-12-21 | End: 2021-04-05

## 2021-02-23 RX ORDER — FEXOFENADINE HCL 180 MG/1
180 TABLET ORAL DAILY
COMMUNITY

## 2021-02-23 RX ORDER — ESCITALOPRAM OXALATE 10 MG/1
10 TABLET ORAL DAILY
Qty: 30 TABLET | Refills: 5 | Status: SHIPPED | OUTPATIENT
Start: 2021-02-23 | End: 2021-04-05 | Stop reason: SDUPTHER

## 2021-02-23 NOTE — PATIENT INSTRUCTIONS
MyPlate from USDA    MyPlate is an outline of a general healthy diet based on the 2010 Dietary Guidelines for Americans, from the U.S. Department of Agriculture (USDA). It sets guidelines for how much food you should eat from each food group based on your age, sex, and level of physical activity.  What are tips for following MyPlate?  To follow MyPlate recommendations:  · Eat a wide variety of fruits and vegetables, grains, and protein foods.  · Serve smaller portions and eat less food throughout the day.  · Limit portion sizes to avoid overeating.  · Enjoy your food.  · Get at least 150 minutes of exercise every week. This is about 30 minutes each day, 5 or more days per week.  It can be difficult to have every meal look like MyPlate. Think about MyPlate as eating guidelines for an entire day, rather than each individual meal.  Fruits and vegetables  · Make half of your plate fruits and vegetables.  · Eat many different colors of fruits and vegetables each day.  · For a 2,000 calorie daily food plan, eat:  ? 2½ cups of vegetables every day.  ? 2 cups of fruit every day.  · 1 cup is equal to:  ? 1 cup raw or cooked vegetables.  ? 1 cup raw fruit.  ? 1 medium-sized orange, apple, or banana.  ? 1 cup 100% fruit or vegetable juice.  ? 2 cups raw leafy greens, such as lettuce, spinach, or kale.  ? ½ cup dried fruit.  Grains  · One fourth of your plate should be grains.  · Make at least half of the grains you eat each day whole grains.  · For a 2,000 calorie daily food plan, eat 6 oz of grains every day.  · 1 oz is equal to:  ? 1 slice bread.  ? 1 cup cereal.  ? ½ cup cooked rice, cereal, or pasta.  Protein  · One fourth of your plate should be protein.  · Eat a wide variety of protein foods, including meat, poultry, fish, eggs, beans, nuts, and tofu.  · For a 2,000 calorie daily food plan, eat 5½ oz of protein every day.  · 1 oz is equal to:  ? 1 oz meat, poultry, or fish.  ? ¼ cup cooked beans.  ? 1 egg.  ? ½ oz nuts  or seeds.  ? 1 Tbsp peanut butter.  Dairy  · Drink fat-free or low-fat (1%) milk.  · Eat or drink dairy as a side to meals.  · For a 2,000 calorie daily food plan, eat or drink 3 cups of dairy every day.  · 1 cup is equal to:  ? 1 cup milk, yogurt, cottage cheese, or soy milk (soy beverage).  ? 2 oz processed cheese.  ? 1½ oz natural cheese.  Fats, oils, salt, and sugars  · Only small amounts of oils are recommended.  · Avoid foods that are high in calories and low in nutritional value (empty calories), like foods high in fat or added sugars.  · Choose foods that are low in salt (sodium). Choose foods that have less than 140 milligrams (mg) of sodium per serving.  · Drink water instead of sugary drinks. Drink enough water each day to keep your urine pale yellow.  Where to find support  · Work with your health care provider or a nutrition specialist (dietitian) to develop a customized eating plan that is right for you.  · Download an glo (mobile application) to help you track your daily food intake.  Where to find more information  · Go to ChooseMyPlate.gov for more information.  Summary  · MyPlate is a general guideline for healthy eating from the USDA. It is based on the 2010 Dietary Guidelines for Americans.  · In general, fruits and vegetables should take up ½ of your plate, grains should take up ¼ of your plate, and protein should take up ¼ of your plate.  This information is not intended to replace advice given to you by your health care provider. Make sure you discuss any questions you have with your health care provider.  Document Revised: 05/21/2020 Document Reviewed: 03/19/2018  Elsevier Patient Education © 2020 Elsevier Inc.

## 2021-02-23 NOTE — PROGRESS NOTES
Subjective   Agatha Carbajal is a 55 y.o. female.     Chief Complaint   Patient presents with   • Annual Exam   • Anxiety   • Insomnia       History of Present Illness   Patient is here for a physical and due for blood work, she is brought  paperwork through her workplace at [employer] to be completed, she also complains of increased anxiety, she was taking Lexapro but it made her gain weight and she stopped taking the medication after 3 months but it helped her with her symptoms, she also states she has been having palpitations and has seen cardiology in the past and she will be following with them she was also seen by endocrinologist and had TSH checked which was within range per patient, she also complains of insomnia and has not been taking trazodone, she was seen by GYN and was put on Ambien    Review of Systems   Constitutional: Negative for appetite change, fatigue and fever.   HENT: Negative for congestion, ear discharge, ear pain, sinus pressure and sore throat.    Eyes: Negative for pain and discharge.   Respiratory: Negative for cough, chest tightness, shortness of breath and wheezing.    Cardiovascular: Negative for chest pain, palpitations and leg swelling.   Gastrointestinal: Negative for abdominal pain, blood in stool, constipation, diarrhea and nausea.   Endocrine: Negative for cold intolerance and heat intolerance.   Genitourinary: Negative for dysuria, flank pain and frequency.   Musculoskeletal: Negative for back pain and joint swelling.   Skin: Negative for color change.   Allergic/Immunologic: Negative for environmental allergies and food allergies.   Neurological: Negative for dizziness, weakness, numbness and headaches.   Hematological: Negative for adenopathy. Does not bruise/bleed easily.   Psychiatric/Behavioral: Negative for behavioral problems and dysphoric mood. The patient is not nervous/anxious.        Past Medical History:   Diagnosis Date   • Acid reflux    • Arthritis    • Bilateral ovarian  cysts    • Breast cancer (CMS/HCC) 2009    RIGHT   • Bronchitis    • Kidney stone    • Migraine headache    • MVP (mitral valve prolapse)    • Osteoporosis    • Piercing     EARS ONLY   • Skin cancer     BASAL CELL ON CHEST, SQUAMOUS CELL RIGHT ARM   • Tinnitus    • Wears glasses     TO READ       Past Surgical History:   Procedure Laterality Date   • BREAST AUGMENTATION Bilateral 2010   • BREAST BIOPSY Right    • COLONOSCOPY  2016   • ENDOSCOPY N/A 2/15/2019    Procedure: ESOPHAGOGASTRODUODENOSCOPY WITH COLD FORCEP BIOPSIES;  Surgeon: Mick Triplett MD;  Location: [facility] ENDOSCOPY;  Service: Gastroenterology   • HYSTERECTOMY      COMPLETE   • MASTECTOMY Bilateral 2009   • SKIN BIOPSY      chest, right arm       Family History   Problem Relation Age of Onset   • Arthritis Mother    • Migraines Mother    • Prostate cancer Father    • Hypertension Father    • Kidney disease Father    • Obesity Father    • Breast cancer Sister    • Heart disease Maternal Grandmother    • Osteoporosis Maternal Grandmother    • Heart disease Paternal Grandfather    • Tuberculosis Paternal Grandfather         reports that she has never smoked. She has never used smokeless tobacco. She reports that she does not drink alcohol or use drugs.    Allergies   Allergen Reactions   • Effexor [Venlafaxine] Other (See Comments)     Weird sensation   • Bactrim [Sulfamethoxazole-Trimethoprim] Rash   • Demerol [Meperidine] Nausea And Vomiting   • Oxycodone Other (See Comments)     HEADACHE             Current Outpatient Medications:   •  Ascorbic Acid (Vitamin C) 100 MG chewable tablet, Vitamin C, Disp: , Rfl:   •  busPIRone (BUSPAR) 5 MG tablet, Take one tablet at bedtime. May take twice more throughout the day as needed for anxiety, Disp: 90 tablet, Rfl: 1  •  calcium citrate-vitamin d (CALCITRATE) 315-250 MG-UNIT tablet tablet, Take 2 tablets by mouth Daily., Disp: , Rfl:   •  Cranberry 1000 MG capsule, cranberry, Disp: , Rfl:   •  fexofenadine  "(ALLEGRA) 180 MG tablet, Take 180 mg by mouth Daily., Disp: , Rfl:   •  SUMAtriptan (IMITREX) 100 MG tablet, Take 1 tablet by mouth 1 (One) Time As Needed for Migraine. Take one tablet at onset of headache. May repeat dose one time in 2 hours if headache not relieved., Disp: 9 tablet, Rfl: 11  •  escitalopram (Lexapro) 10 MG tablet, Take 1 tablet by mouth Daily., Disp: 30 tablet, Rfl: 5  •  traZODone (DESYREL) 100 MG tablet, Take 100 mg by mouth every night at bedtime., Disp: , Rfl:       Objective   Blood pressure 125/83, pulse 80, temperature 97.5 °F (36.4 °C), resp. rate 16, height 172.7 cm (68\"), weight 83.9 kg (185 lb), SpO2 97 %, not currently breastfeeding.    Physical Exam  Vitals signs and nursing note reviewed.   Constitutional:       General: She is not in acute distress.     Appearance: Normal appearance. She is not diaphoretic.   HENT:      Head: Normocephalic and atraumatic.      Right Ear: External ear normal.      Left Ear: External ear normal.      Nose: Nose normal.   Eyes:      Extraocular Movements: Extraocular movements intact.      Conjunctiva/sclera: Conjunctivae normal.   Neck:      Musculoskeletal: Neck supple.      Trachea: Trachea normal.   Cardiovascular:      Rate and Rhythm: Normal rate and regular rhythm.      Heart sounds: Normal heart sounds.   Pulmonary:      Effort: Pulmonary effort is normal. No respiratory distress.      Breath sounds: Normal breath sounds.   Abdominal:      General: Abdomen is flat.   Musculoskeletal:      Comments: Moves all limbs   Skin:     General: Skin is warm.   Neurological:      Mental Status: She is alert and oriented to person, place, and time.      Comments: No gross motor or sensory deficits         Patient's Body mass index is 28.13 kg/m². BMI is within normal parameters. No follow-up required..      Results for orders placed or performed in visit on 09/09/19   Comprehensive Metabolic Panel    Specimen: Blood   Result Value Ref Range    Glucose 99 " 65 - 99 mg/dL    BUN 13 6 - 20 mg/dL    Creatinine 0.76 0.57 - 1.00 mg/dL    eGFR Non African Am 80 >60 mL/min/1.73    eGFR African Am 96 >60 mL/min/1.73    BUN/Creatinine Ratio 17.1 7.0 - 25.0    Sodium 142 136 - 145 mmol/L    Potassium 4.4 3.5 - 5.2 mmol/L    Chloride 103 98 - 107 mmol/L    Total CO2 26.1 22.0 - 29.0 mmol/L    Calcium 9.2 8.6 - 10.5 mg/dL    Total Protein 6.9 6.0 - 8.5 g/dL    Albumin 4.40 3.50 - 5.20 g/dL    Globulin 2.5 gm/dL    A/G Ratio 1.8 g/dL    Total Bilirubin 0.5 0.2 - 1.2 mg/dL    Alkaline Phosphatase 101 39 - 117 U/L    AST (SGOT) 18 1 - 32 U/L    ALT (SGPT) 14 1 - 33 U/L   Lipid Panel    Specimen: Blood   Result Value Ref Range    Total Cholesterol 206 (H) 0 - 200 mg/dL    Triglycerides 88 0 - 150 mg/dL    HDL Cholesterol 69 (H) 40 - 60 mg/dL    VLDL Cholesterol 17.6 mg/dL    LDL Cholesterol  119 (H) 0 - 100 mg/dL   CBC & Differential    Specimen: Blood   Result Value Ref Range    WBC 5.25 3.40 - 10.80 10*3/mm3    RBC 4.53 3.77 - 5.28 10*6/mm3    Hemoglobin 12.6 12.0 - 15.9 g/dL    Hematocrit 40.7 34.0 - 46.6 %    MCV 89.8 79.0 - 97.0 fL    MCH 27.8 26.6 - 33.0 pg    MCHC 31.0 (L) 31.5 - 35.7 g/dL    RDW 14.3 12.3 - 15.4 %    Platelets 288 140 - 450 10*3/mm3    Neutrophil Rel % 55.2 42.7 - 76.0 %    Lymphocyte Rel % 33.3 19.6 - 45.3 %    Monocyte Rel % 6.7 5.0 - 12.0 %    Eosinophil Rel % 3.4 0.3 - 6.2 %    Basophil Rel % 1.0 0.0 - 1.5 %    Neutrophils Absolute 2.90 1.70 - 7.00 10*3/mm3    Lymphocytes Absolute 1.75 0.70 - 3.10 10*3/mm3    Monocytes Absolute 0.35 0.10 - 0.90 10*3/mm3    Eosinophils Absolute 0.18 0.00 - 0.40 10*3/mm3    Basophils Absolute 0.05 0.00 - 0.20 10*3/mm3    Immature Granulocyte Rel % 0.4 0.0 - 0.5 %    Immature Grans Absolute 0.02 0.00 - 0.05 10*3/mm3    nRBC 0.0 0.0 - 0.2 /100 WBC         Assessment/Plan   Diagnoses and all orders for this visit:    1. Routine general medical examination at a health care facility (Primary)  -     CBC & Differential  -      Comprehensive Metabolic Panel  -     Lipid Panel    2. Anxiety  -     escitalopram (Lexapro) 10 MG tablet; Take 1 tablet by mouth Daily.  Dispense: 30 tablet; Refill: 5    3. Insomnia, psychophysiological      plan:  1.physical: Physical exam conducted today,  Will obtain fasting lab work,   Impression: healthy adult Patient. Currently, patient eats a healthy diet. Screening lab work includes glucose, lipid profile , complete blood count and comprehensive panel . The risks and benefits of immunizations were discussed and immunizations are up to date. Advice and education were given regarding nutrition and aerobic exercise. Patient discussion: discussed with the patient.  Physical with preventive exam as well as age and risk appropriate counseling completed.   Patient Discussion: plan discussed with the patient, follow-up visit needed in one year. Medication Review and medication list updated  Advised Monthly self breast assessment and annual breast imaging and Calcium, 600 mg/ Vit. D, 400 IU daily; regular weight-bearing exercise  2.  Anxiety disorder: We will start Lexapro 10 mg daily  3.  Insomnia: Patient advised to try trazodone to see if it helps her symptoms             Fawn Fischer MD

## 2021-03-25 LAB
ALBUMIN SERPL-MCNC: 4.4 G/DL (ref 3.5–5.2)
ALBUMIN/GLOB SERPL: 1.8 G/DL
ALP SERPL-CCNC: 97 U/L (ref 39–117)
ALT SERPL-CCNC: 14 U/L (ref 1–33)
AST SERPL-CCNC: 17 U/L (ref 1–32)
BASOPHILS # BLD AUTO: 0.04 10*3/MM3 (ref 0–0.2)
BASOPHILS NFR BLD AUTO: 0.8 % (ref 0–1.5)
BILIRUB SERPL-MCNC: 0.6 MG/DL (ref 0–1.2)
BUN SERPL-MCNC: 11 MG/DL (ref 6–20)
BUN/CREAT SERPL: 16.2 (ref 7–25)
CALCIUM SERPL-MCNC: 9.1 MG/DL (ref 8.6–10.5)
CHLORIDE SERPL-SCNC: 104 MMOL/L (ref 98–107)
CHOLEST SERPL-MCNC: 204 MG/DL (ref 0–200)
CO2 SERPL-SCNC: 28 MMOL/L (ref 22–29)
CREAT SERPL-MCNC: 0.68 MG/DL (ref 0.57–1)
EOSINOPHIL # BLD AUTO: 0.1 10*3/MM3 (ref 0–0.4)
EOSINOPHIL NFR BLD AUTO: 2.1 % (ref 0.3–6.2)
ERYTHROCYTE [DISTWIDTH] IN BLOOD BY AUTOMATED COUNT: 13.1 % (ref 12.3–15.4)
GLOBULIN SER CALC-MCNC: 2.5 GM/DL
GLUCOSE SERPL-MCNC: 96 MG/DL (ref 65–99)
HCT VFR BLD AUTO: 39.5 % (ref 34–46.6)
HDLC SERPL-MCNC: 68 MG/DL (ref 40–60)
HGB BLD-MCNC: 13.1 G/DL (ref 12–15.9)
IMM GRANULOCYTES # BLD AUTO: 0.01 10*3/MM3 (ref 0–0.05)
IMM GRANULOCYTES NFR BLD AUTO: 0.2 % (ref 0–0.5)
LDLC SERPL CALC-MCNC: 121 MG/DL (ref 0–100)
LYMPHOCYTES # BLD AUTO: 1.77 10*3/MM3 (ref 0.7–3.1)
LYMPHOCYTES NFR BLD AUTO: 36.6 % (ref 19.6–45.3)
MCH RBC QN AUTO: 28.8 PG (ref 26.6–33)
MCHC RBC AUTO-ENTMCNC: 33.2 G/DL (ref 31.5–35.7)
MCV RBC AUTO: 86.8 FL (ref 79–97)
MONOCYTES # BLD AUTO: 0.3 10*3/MM3 (ref 0.1–0.9)
MONOCYTES NFR BLD AUTO: 6.2 % (ref 5–12)
NEUTROPHILS # BLD AUTO: 2.62 10*3/MM3 (ref 1.7–7)
NEUTROPHILS NFR BLD AUTO: 54.1 % (ref 42.7–76)
NRBC BLD AUTO-RTO: 0 /100 WBC (ref 0–0.2)
PLATELET # BLD AUTO: 298 10*3/MM3 (ref 140–450)
POTASSIUM SERPL-SCNC: 4.4 MMOL/L (ref 3.5–5.2)
PROT SERPL-MCNC: 6.9 G/DL (ref 6–8.5)
RBC # BLD AUTO: 4.55 10*6/MM3 (ref 3.77–5.28)
SODIUM SERPL-SCNC: 140 MMOL/L (ref 136–145)
TRIGL SERPL-MCNC: 84 MG/DL (ref 0–150)
VLDLC SERPL CALC-MCNC: 15 MG/DL (ref 5–40)
WBC # BLD AUTO: 4.84 10*3/MM3 (ref 3.4–10.8)

## 2021-04-05 ENCOUNTER — OFFICE VISIT (OUTPATIENT)
Dept: INTERNAL MEDICINE | Facility: CLINIC | Age: 56
End: 2021-04-05

## 2021-04-05 DIAGNOSIS — G43.109 MIGRAINE WITH AURA AND WITHOUT STATUS MIGRAINOSUS, NOT INTRACTABLE: ICD-10-CM

## 2021-04-05 DIAGNOSIS — E78.2 MIXED HYPERLIPIDEMIA: Primary | ICD-10-CM

## 2021-04-05 DIAGNOSIS — F41.9 ANXIETY: ICD-10-CM

## 2021-04-05 PROCEDURE — 99442 PR PHYS/QHP TELEPHONE EVALUATION 11-20 MIN: CPT | Performed by: INTERNAL MEDICINE

## 2021-04-05 RX ORDER — ESCITALOPRAM OXALATE 10 MG/1
10 TABLET ORAL DAILY
Qty: 30 TABLET | Refills: 5 | Status: SHIPPED | OUTPATIENT
Start: 2021-04-05 | End: 2022-07-05

## 2021-04-05 RX ORDER — SUMATRIPTAN 100 MG/1
100 TABLET, FILM COATED ORAL ONCE AS NEEDED
Qty: 9 TABLET | Refills: 3 | Status: SHIPPED | OUTPATIENT
Start: 2021-04-05 | End: 2022-07-05 | Stop reason: SDUPTHER

## 2021-04-05 NOTE — PATIENT INSTRUCTIONS
MyPlate from USDA    MyPlate is an outline of a general healthy diet based on the 2010 Dietary Guidelines for Americans, from the U.S. Department of Agriculture (USDA). It sets guidelines for how much food you should eat from each food group based on your age, sex, and level of physical activity.  What are tips for following MyPlate?  To follow MyPlate recommendations:  · Eat a wide variety of fruits and vegetables, grains, and protein foods.  · Serve smaller portions and eat less food throughout the day.  · Limit portion sizes to avoid overeating.  · Enjoy your food.  · Get at least 150 minutes of exercise every week. This is about 30 minutes each day, 5 or more days per week.  It can be difficult to have every meal look like MyPlate. Think about MyPlate as eating guidelines for an entire day, rather than each individual meal.  Fruits and vegetables  · Make half of your plate fruits and vegetables.  · Eat many different colors of fruits and vegetables each day.  · For a 2,000 calorie daily food plan, eat:  ? 2½ cups of vegetables every day.  ? 2 cups of fruit every day.  · 1 cup is equal to:  ? 1 cup raw or cooked vegetables.  ? 1 cup raw fruit.  ? 1 medium-sized orange, apple, or banana.  ? 1 cup 100% fruit or vegetable juice.  ? 2 cups raw leafy greens, such as lettuce, spinach, or kale.  ? ½ cup dried fruit.  Grains  · One fourth of your plate should be grains.  · Make at least half of the grains you eat each day whole grains.  · For a 2,000 calorie daily food plan, eat 6 oz of grains every day.  · 1 oz is equal to:  ? 1 slice bread.  ? 1 cup cereal.  ? ½ cup cooked rice, cereal, or pasta.  Protein  · One fourth of your plate should be protein.  · Eat a wide variety of protein foods, including meat, poultry, fish, eggs, beans, nuts, and tofu.  · For a 2,000 calorie daily food plan, eat 5½ oz of protein every day.  · 1 oz is equal to:  ? 1 oz meat, poultry, or fish.  ? ¼ cup cooked beans.  ? 1 egg.  ? ½ oz nuts  or seeds.  ? 1 Tbsp peanut butter.  Dairy  · Drink fat-free or low-fat (1%) milk.  · Eat or drink dairy as a side to meals.  · For a 2,000 calorie daily food plan, eat or drink 3 cups of dairy every day.  · 1 cup is equal to:  ? 1 cup milk, yogurt, cottage cheese, or soy milk (soy beverage).  ? 2 oz processed cheese.  ? 1½ oz natural cheese.  Fats, oils, salt, and sugars  · Only small amounts of oils are recommended.  · Avoid foods that are high in calories and low in nutritional value (empty calories), like foods high in fat or added sugars.  · Choose foods that are low in salt (sodium). Choose foods that have less than 140 milligrams (mg) of sodium per serving.  · Drink water instead of sugary drinks. Drink enough water each day to keep your urine pale yellow.  Where to find support  · Work with your health care provider or a nutrition specialist (dietitian) to develop a customized eating plan that is right for you.  · Download an glo (mobile application) to help you track your daily food intake.  Where to find more information  · Go to ChooseMyPlate.gov for more information.  Summary  · MyPlate is a general guideline for healthy eating from the USDA. It is based on the 2010 Dietary Guidelines for Americans.  · In general, fruits and vegetables should take up ½ of your plate, grains should take up ¼ of your plate, and protein should take up ¼ of your plate.  This information is not intended to replace advice given to you by your health care provider. Make sure you discuss any questions you have with your health care provider.  Document Revised: 05/21/2020 Document Reviewed: 03/19/2018  Elsevier Patient Education © 2021 Elsevier Inc.

## 2021-04-05 NOTE — PROGRESS NOTES
Subjective   Agatha Carbajal is a 55 y.o. female.     You have chosen to receive care through a telephone visit. Do you consent to use a telephone visit for your medical care today? YES   Physician : Dr. Sullivan  Nurse : Paris Pfeiffer  Patient : Agatha Carbajal    Chief Complaint   Patient presents with   • Hyperlipidemia   • Anxiety       History of Present Illness   Telephone visit with patient today, patient is following up on cholesterol and had lab work done, she is also following up on anxiety and has been taking Lexapro, she also complains of migraine and needs refills on Imitrex     the following portions of the patient's history were reviewed and updated as appropriate: allergies, current medications, past family history, past medical history, past social history, past surgical history and problem list.    Review of Systems  Anxiety  Migraine  Afebrile      Current Outpatient Medications:   •  Ascorbic Acid (Vitamin C) 100 MG chewable tablet, Vitamin C, Disp: , Rfl:   •  calcium citrate-vitamin d (CALCITRATE) 315-250 MG-UNIT tablet tablet, Take 2 tablets by mouth Daily., Disp: , Rfl:   •  Cranberry 1000 MG capsule, cranberry, Disp: , Rfl:   •  escitalopram (Lexapro) 10 MG tablet, Take 1 tablet by mouth Daily., Disp: 30 tablet, Rfl: 5  •  fexofenadine (ALLEGRA) 180 MG tablet, Take 180 mg by mouth Daily., Disp: , Rfl:   •  SUMAtriptan (IMITREX) 100 MG tablet, Take 1 tablet by mouth 1 (One) Time As Needed for Migraine. Take one tablet at onset of headache. May repeat dose one time in 2 hours if headache not relieved., Disp: 9 tablet, Rfl: 3    Objective     not currently breastfeeding.    Physical Exam   bp  : 137/79  Pulse :75  Afebrile  Weight : 183 lbs     Results for orders placed or performed in visit on 02/23/21   Comprehensive Metabolic Panel    Specimen: Blood   Result Value Ref Range    Glucose 96 65 - 99 mg/dL    BUN 11 6 - 20 mg/dL    Creatinine 0.68 0.57 - 1.00 mg/dL    eGFR Non African Am 90 >60  mL/min/1.73    eGFR African Am 109 >60 mL/min/1.73    BUN/Creatinine Ratio 16.2 7.0 - 25.0    Sodium 140 136 - 145 mmol/L    Potassium 4.4 3.5 - 5.2 mmol/L    Chloride 104 98 - 107 mmol/L    Total CO2 28.0 22.0 - 29.0 mmol/L    Calcium 9.1 8.6 - 10.5 mg/dL    Total Protein 6.9 6.0 - 8.5 g/dL    Albumin 4.40 3.50 - 5.20 g/dL    Globulin 2.5 gm/dL    A/G Ratio 1.8 g/dL    Total Bilirubin 0.6 0.0 - 1.2 mg/dL    Alkaline Phosphatase 97 39 - 117 U/L    AST (SGOT) 17 1 - 32 U/L    ALT (SGPT) 14 1 - 33 U/L   Lipid Panel    Specimen: Blood   Result Value Ref Range    Total Cholesterol 204 (H) 0 - 200 mg/dL    Triglycerides 84 0 - 150 mg/dL    HDL Cholesterol 68 (H) 40 - 60 mg/dL    VLDL Cholesterol Devon 15 5 - 40 mg/dL    LDL Chol Calc (NIH) 121 (H) 0 - 100 mg/dL   CBC & Differential    Specimen: Blood   Result Value Ref Range    WBC 4.84 3.40 - 10.80 10*3/mm3    RBC 4.55 3.77 - 5.28 10*6/mm3    Hemoglobin 13.1 12.0 - 15.9 g/dL    Hematocrit 39.5 34.0 - 46.6 %    MCV 86.8 79.0 - 97.0 fL    MCH 28.8 26.6 - 33.0 pg    MCHC 33.2 31.5 - 35.7 g/dL    RDW 13.1 12.3 - 15.4 %    Platelets 298 140 - 450 10*3/mm3    Neutrophil Rel % 54.1 42.7 - 76.0 %    Lymphocyte Rel % 36.6 19.6 - 45.3 %    Monocyte Rel % 6.2 5.0 - 12.0 %    Eosinophil Rel % 2.1 0.3 - 6.2 %    Basophil Rel % 0.8 0.0 - 1.5 %    Neutrophils Absolute 2.62 1.70 - 7.00 10*3/mm3    Lymphocytes Absolute 1.77 0.70 - 3.10 10*3/mm3    Monocytes Absolute 0.30 0.10 - 0.90 10*3/mm3    Eosinophils Absolute 0.10 0.00 - 0.40 10*3/mm3    Basophils Absolute 0.04 0.00 - 0.20 10*3/mm3    Immature Granulocyte Rel % 0.2 0.0 - 0.5 %    Immature Grans Absolute 0.01 0.00 - 0.05 10*3/mm3    nRBC 0.0 0.0 - 0.2 /100 WBC         Assessment/Plan   Diagnoses and all orders for this visit:    1. Mixed hyperlipidemia (Primary)  -     CBC & Differential  -     Comprehensive Metabolic Panel  -     Lipid Panel    2. Anxiety  -     escitalopram (Lexapro) 10 MG tablet; Take 1 tablet by mouth Daily.   Dispense: 30 tablet; Refill: 5    3. Migraine with aura and without status migrainosus, not intractable  -     SUMAtriptan (IMITREX) 100 MG tablet; Take 1 tablet by mouth 1 (One) Time As Needed for Migraine. Take one tablet at onset of headache. May repeat dose one time in 2 hours if headache not relieved.  Dispense: 9 tablet; Refill: 3    Plan:  1.   mixed hyperlipidemia:  reviewed  fasting CMP and lipid panel.  Diet and exercise counseled,    2.  Anxiety: Refill Lexapro 10 mg daily, labs reviewed  3.  Migraines: Refilled Imitrex  This was an audio   enabled telemedicine encounter.  This visit has been rescheduled as a phone visit to comply with patient safety concerns in accordance with CDC recommendations. Total time of discussion was  11 minutes.                 Fawn Fischer MD

## 2021-11-10 ENCOUNTER — TELEPHONE (OUTPATIENT)
Dept: SURGERY | Facility: CLINIC | Age: 56
End: 2021-11-10

## 2021-11-10 RX ORDER — BISACODYL 5 MG
TABLET, DELAYED RELEASE (ENTERIC COATED) ORAL
Qty: 4 TABLET | Refills: 0 | Status: SHIPPED | OUTPATIENT
Start: 2021-11-10 | End: 2022-07-05

## 2021-11-10 RX ORDER — POLYETHYLENE GLYCOL 3350 17 G/17G
POWDER, FOR SOLUTION ORAL
Qty: 238 G | Refills: 0 | Status: SHIPPED | OUTPATIENT
Start: 2021-11-10 | End: 2022-07-05

## 2021-11-10 NOTE — TELEPHONE ENCOUNTER
Pt is on 5 yr recall for colonoscopy.  PRESCREENING FOR OPEN ACCESS SCHEDULING    Agatha Carbajal, 1965  [id number]    11/10/21    If, the patient answers yes to any of the following questions the provider will be informed prior to scheduling open access for approval and documented in the chart.    [x]  Yes  [] No    1. Have you ever had a colonoscopy in the past?      When:        Where:       Polyps or other:     []  Yes  [x] No    2. Family history of colon cancer?      Relation:       Age of onset:       Do you currently have any of the following?    []  Yes  [x] No  Rectal bleeding, if so, how long?     []  Yes  [x] No  Abdominal pain, if so, how long?    []  Yes  [x] No  Constipation, if so, how long?    []  Yes  [x] No  Diarrhea, if so, how long?    []  Yes  [x] No  Weight loss, is so, how much?    [] Yes  [x] No  Small caliber stool, if so, how long?      Have you ever had any of the following conditions?    [] Yes  [x] No  Heart attack?      When?       Last cardiac workup?     Blood thinners?    [] Yes  [x] No   Lung problems, asthma or COPD?  [] Yes  [x] No  Oxygen required?       [] Yes  [x] No  Stroke?     [] Yes  [x] No  Have you ever had a reaction to anesthesia?

## 2021-11-10 NOTE — TELEPHONE ENCOUNTER
Pt is scheduled for 3/14/22 at [facility] for colon.  Instructions mailed, prep sent in.

## 2021-11-15 ENCOUNTER — PREP FOR SURGERY (OUTPATIENT)
Dept: OTHER | Facility: HOSPITAL | Age: 56
End: 2021-11-15

## 2021-11-15 DIAGNOSIS — Z12.11 COLON CANCER SCREENING: Primary | ICD-10-CM

## 2021-11-16 PROBLEM — Z12.11 COLON CANCER SCREENING: Status: ACTIVE | Noted: 2021-11-16

## 2021-11-29 ENCOUNTER — TELEPHONE (OUTPATIENT)
Dept: INTERNAL MEDICINE | Facility: CLINIC | Age: 56
End: 2021-11-29

## 2021-11-29 DIAGNOSIS — Z20.822 CLOSE EXPOSURE TO COVID-19 VIRUS: Primary | ICD-10-CM

## 2021-11-29 NOTE — TELEPHONE ENCOUNTER
Spoke with patient's  - Agatha contacted [facility] and they stated that they do not require a test result for antibody infusion, just a provider's order. Please fax the infusion order to [phone] and contact [facility] when this has been done.    Patient's  also relayed that Agatha quit taking her Lexapro due to weight gain (unkown date). Per , relayed that it is ok for her to start back up.

## 2021-11-29 NOTE — TELEPHONE ENCOUNTER
Pt notified, verbalized understanding. Pt stated that her daughter was being admitted to the hospital as we were speaking.  Pt was advised that she would need a PCR test before she could get an infusion. Pt stated that her nerves were a mess because of her daughter and that she was sitting in her car waiting on news about her daughter. Pt stated she had contacted [facility] and was advised that if she went thru the ER with that facility they would do the test and the infusion there, and because of her situation with her daughter that is what she would probably do.

## 2021-11-29 NOTE — TELEPHONE ENCOUNTER
Caller: Agatha Carbajal    Relationship to patient: Self    Best call back number: [phone]    Date of exposure: 11/23/21    Date of positive COVID19 test: 11/28/21 RAPID TEST POSITIVE     Date if possible COVID19 exposure: 11/24/21    COVID19 symptoms: STUFFY HEAD/NOSE, DRAINAGE, CHEST PRESSURE, DIZZY, LOW GRADE FEVER    Date of initial quarantine: 11/27/21    Additional information or concerns: PATIENTS DAUGHTER TESTED POSITIVE 11/26/21 SO PATIENT GOT TESTED AND ALSO TESTED POSITIVE. 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 PATIENT IS VACCINATED. PLEASE ADVISE AND CALL PATIENT      What is the patients preferred pharmacy: [id number] - [place], [place] - [address] - [phone]  - [phone] FX

## 2022-03-03 ENCOUNTER — TELEPHONE (OUTPATIENT)
Dept: SURGERY | Facility: CLINIC | Age: 57
End: 2022-03-03

## 2022-03-03 NOTE — TELEPHONE ENCOUNTER
Please give patient a call needs to reschedule her colonoscopy that is scheduled 03/14/22 ,Dr Triplett.

## 2022-03-30 ENCOUNTER — TELEPHONE (OUTPATIENT)
Dept: SURGERY | Facility: CLINIC | Age: 57
End: 2022-03-30

## 2022-04-01 ENCOUNTER — TELEPHONE (OUTPATIENT)
Dept: SURGERY | Facility: CLINIC | Age: 57
End: 2022-04-01

## 2022-06-07 ENCOUNTER — TELEPHONE (OUTPATIENT)
Dept: SURGERY | Facility: CLINIC | Age: 57
End: 2022-06-07

## 2022-06-27 DIAGNOSIS — G43.109 MIGRAINE WITH AURA AND WITHOUT STATUS MIGRAINOSUS, NOT INTRACTABLE: ICD-10-CM

## 2022-06-28 RX ORDER — SUMATRIPTAN 100 MG/1
100 TABLET, FILM COATED ORAL ONCE AS NEEDED
Qty: 9 TABLET | Refills: 3 | OUTPATIENT
Start: 2022-06-28

## 2022-07-05 ENCOUNTER — OFFICE VISIT (OUTPATIENT)
Dept: INTERNAL MEDICINE | Facility: CLINIC | Age: 57
End: 2022-07-05

## 2022-07-05 VITALS
HEART RATE: 70 BPM | OXYGEN SATURATION: 98 % | HEIGHT: 68 IN | WEIGHT: 195 LBS | DIASTOLIC BLOOD PRESSURE: 88 MMHG | RESPIRATION RATE: 16 BRPM | TEMPERATURE: 97.7 F | BODY MASS INDEX: 29.55 KG/M2 | SYSTOLIC BLOOD PRESSURE: 144 MMHG

## 2022-07-05 DIAGNOSIS — G43.109 MIGRAINE WITH AURA AND WITHOUT STATUS MIGRAINOSUS, NOT INTRACTABLE: ICD-10-CM

## 2022-07-05 DIAGNOSIS — R03.0 ELEVATED BLOOD PRESSURE READING WITHOUT DIAGNOSIS OF HYPERTENSION: Primary | ICD-10-CM

## 2022-07-05 DIAGNOSIS — E78.2 MIXED HYPERLIPIDEMIA: ICD-10-CM

## 2022-07-05 PROCEDURE — 99214 OFFICE O/P EST MOD 30 MIN: CPT | Performed by: INTERNAL MEDICINE

## 2022-07-05 RX ORDER — SUMATRIPTAN 100 MG/1
100 TABLET, FILM COATED ORAL ONCE AS NEEDED
Qty: 9 TABLET | Refills: 3 | Status: SHIPPED | OUTPATIENT
Start: 2022-07-05

## 2022-07-05 NOTE — PROGRESS NOTES
"Chief Complaint  Hyperlipidemia and Migraine    Subjective    {Problem List  Visit Diagnosis   Encounters  Notes  Medications  Labs  Result Review Imaging  Media :23}    Agatha Carbajal presents to [facility]  History of Present Illness  HPI: Patient is here to follow up on the blood pressure    Which is slightly elevated today , but her home readings are within range . The patient is also here to follow up on the cholesterol and is trying to follow a diet. The patient  is  due to get lab work done .  The patient also  Complains of migraines and needs refills on medications .   Hyperlipidemia   Pertinent negatives include no chest pain or shortness of breath.     Objective   Vital Signs:  /88   Pulse 70   Temp 97.7 °F (36.5 °C)   Resp 16   Ht 172.7 cm (67.99\")   Wt 88.5 kg (195 lb)   SpO2 98%   BMI 29.66 kg/m²   Estimated body mass index is 29.66 kg/m² as calculated from the following:    Height as of this encounter: 172.7 cm (67.99\").    Weight as of this encounter: 88.5 kg (195 lb).  Vitals:    07/05/22 0820 07/05/22 0854   BP: 133/89 144/88   Pulse: 75 70   Resp: 16    Temp: 97.7 °F (36.5 °C)    SpO2: 98%    Weight: 88.5 kg (195 lb)    Height: 172.7 cm (67.99\")        BMI is >= 25 and <30. (Overweight) The following options were offered after discussion;: weight loss educational material (shared in after visit summary), exercise counseling/recommendations and nutrition counseling/recommendations      Physical Exam  Vitals and nursing note reviewed.   Constitutional:       General: She is not in acute distress.     Appearance: Normal appearance. She is not diaphoretic.   HENT:      Head: Normocephalic and atraumatic.      Right Ear: External ear normal.      Left Ear: External ear normal.      Nose: Nose normal.   Eyes:      Extraocular Movements: Extraocular movements intact.      Conjunctiva/sclera: Conjunctivae normal.   Neck:      Trachea: Trachea normal. "   Cardiovascular:      Rate and Rhythm: Normal rate and regular rhythm.      Heart sounds: Normal heart sounds.   Pulmonary:      Effort: Pulmonary effort is normal. No respiratory distress.      Breath sounds: Normal breath sounds.   Abdominal:      General: Abdomen is flat.   Musculoskeletal:      Cervical back: Neck supple.      Comments: Moves all limbs   Skin:     General: Skin is warm.   Neurological:      Mental Status: She is alert and oriented to person, place, and time.      Comments: No gross motor or sensory deficits        Result Review :                  Assessment and Plan   Diagnoses and all orders for this visit:    1. Elevated blood pressure reading without diagnosis of hypertension (Primary)    2. Mixed hyperlipidemia  -     CBC & Differential  -     Comprehensive Metabolic Panel  -     Lipid Panel    3. Migraine with aura and without status migrainosus, not intractable  -     SUMAtriptan (IMITREX) 100 MG tablet; Take 1 tablet by mouth 1 (One) Time As Needed for Migraine. Take one tablet at onset of headache. May repeat dose one time in 2 hours if headache not relieved.  Dispense: 9 tablet; Refill: 3    Plan:  1. elevated blood pressure without a diagnosis of hypertension: Patient advised to monitor vitals and follow low-sodium diet  2.  Mixed hyperlipidemia: Will obtain fasting CMP lipid panel, diet and exercise counseled  3.  Migraines: As needed Imitrex     I spent 30 minutes caring for Agatha on this date of service. This time includes time spent by me in the following activities:preparing for the visit, reviewing tests, performing a medically appropriate examination and/or evaluation , counseling and educating the patient/family/caregiver, ordering medications, tests, or procedures and documenting information in the medical record  Follow Up   Return in about 33 weeks (around 2/21/2023).  Patient was given instructions and counseling regarding her condition or for health maintenance advice.  Please see specific information pulled into the AVS if appropriate.

## 2022-07-15 ENCOUNTER — HOSPITAL ENCOUNTER (EMERGENCY)
Facility: HOSPITAL | Age: 57
Discharge: HOME | End: 2022-07-15
Attending: EMERGENCY MEDICINE | Admitting: EMERGENCY MEDICINE

## 2022-07-15 ENCOUNTER — APPOINTMENT (OUTPATIENT)
Dept: GENERAL RADIOLOGY | Facility: HOSPITAL | Age: 57
End: 2022-07-15

## 2022-07-15 VITALS
HEIGHT: 68 IN | WEIGHT: 195 LBS | BODY MASS INDEX: 29.55 KG/M2 | SYSTOLIC BLOOD PRESSURE: 141 MMHG | TEMPERATURE: 97.6 F | HEART RATE: 89 BPM | OXYGEN SATURATION: 97 % | DIASTOLIC BLOOD PRESSURE: 79 MMHG | RESPIRATION RATE: 18 BRPM

## 2022-07-15 DIAGNOSIS — S32.010A COMPRESSION FRACTURE OF L1 VERTEBRA, INITIAL ENCOUNTER: Primary | ICD-10-CM

## 2022-07-15 PROCEDURE — 99283 EMERGENCY DEPT VISIT LOW MDM: CPT

## 2022-07-15 PROCEDURE — 72100 X-RAY EXAM L-S SPINE 2/3 VWS: CPT

## 2022-07-15 PROCEDURE — 63710000001 ONDANSETRON ODT 4 MG TABLET DISPERSIBLE: Performed by: EMERGENCY MEDICINE

## 2022-07-15 RX ORDER — ONDANSETRON 4 MG/1
4 TABLET, ORALLY DISINTEGRATING ORAL ONCE
Status: DISCONTINUED | OUTPATIENT
Start: 2022-07-15 | End: 2022-07-15 | Stop reason: HOSPADM

## 2022-07-15 RX ORDER — CYCLOBENZAPRINE HCL 5 MG
5 TABLET ORAL 3 TIMES DAILY PRN
Qty: 15 TABLET | Refills: 0 | Status: SHIPPED | OUTPATIENT
Start: 2022-07-15

## 2022-07-15 RX ORDER — HYDROCODONE BITARTRATE AND ACETAMINOPHEN 10; 325 MG/1; MG/1
1 TABLET ORAL ONCE
Status: COMPLETED | OUTPATIENT
Start: 2022-07-15 | End: 2022-07-15

## 2022-07-15 RX ORDER — HYDROCODONE BITARTRATE AND ACETAMINOPHEN 5; 325 MG/1; MG/1
1 TABLET ORAL EVERY 6 HOURS PRN
Qty: 12 TABLET | Refills: 0 | Status: SHIPPED | OUTPATIENT
Start: 2022-07-15 | End: 2023-01-04

## 2022-07-15 RX ADMIN — HYDROCODONE BITARTRATE AND ACETAMINOPHEN 1 TABLET: 10; 325 TABLET ORAL at 12:48

## 2022-07-15 NOTE — ED PROVIDER NOTES
Subjective   History of Present Illness    Chief Complaint: Fall injury  History of Present Illness: 56-year-old female presents with low back pain status post fall.  She also states she struck her head denies LOC vomiting confusion or focal weakness.  Also struck her left shoulder but it just feels sore.  Mechanical fall related to wet floor at home  Onset: Today just prior  Duration: Persistent  Exacerbating / Alleviating factors: Worse range of motion  Associated symptoms: None      Nurses Notes reviewed and agree, including vitals, allergies, social history and prior medical history.     REVIEW OF SYSTEMS: All systems reviewed and not pertinent unless noted.    Positive for: Primarily low back pain status post fall, did strike her left side of her head and left shoulder     Negative for: LOC vomiting confusion weakness vision changes incontinence  Past Medical History:   Diagnosis Date   • Acid reflux    • Arthritis    • Bilateral ovarian cysts    • Breast cancer (HCC) 2009    RIGHT   • Bronchitis    • Kidney stone    • Migraine headache    • MVP (mitral valve prolapse)    • Osteoporosis    • Piercing     EARS ONLY   • Skin cancer     BASAL CELL ON CHEST, SQUAMOUS CELL RIGHT ARM   • Tinnitus    • Wears glasses     TO READ       Allergies   Allergen Reactions   • Effexor [Venlafaxine] Other (See Comments)     Weird sensation   • Bactrim [Sulfamethoxazole-Trimethoprim] Rash   • Demerol [Meperidine] Nausea And Vomiting   • Oxycodone Other (See Comments)     HEADACHE         Past Surgical History:   Procedure Laterality Date   • BREAST AUGMENTATION Bilateral 2010   • BREAST BIOPSY Right    • COLONOSCOPY  2016   • ENDOSCOPY N/A 2/15/2019    Procedure: ESOPHAGOGASTRODUODENOSCOPY WITH COLD FORCEP BIOPSIES;  Surgeon: Mick Triplett MD;  Location: [facility] ENDOSCOPY;  Service: Gastroenterology   • HYSTERECTOMY      COMPLETE   • MASTECTOMY Bilateral 2009   • SKIN BIOPSY      chest, right arm       Family History   Problem  "Relation Age of Onset   • Arthritis Mother    • Migraines Mother    • Prostate cancer Father    • Hypertension Father    • Kidney disease Father    • Obesity Father    • Breast cancer Sister    • Heart disease Maternal Grandmother    • Osteoporosis Maternal Grandmother    • Heart disease Paternal Grandfather    • Tuberculosis Paternal Grandfather        Social History     Socioeconomic History   • Marital status:    Tobacco Use   • Smoking status: Never Smoker   • Smokeless tobacco: Never Used   Substance and Sexual Activity   • Alcohol use: No   • Drug use: No   • Sexual activity: Defer           Objective   Physical Exam  /79   Pulse 89   Temp 97.6 °F (36.4 °C) (Oral)   Resp 18   Ht 172.7 cm (68\")   Wt 88.5 kg (195 lb)   SpO2 96%   BMI 29.65 kg/m²     CONSTITUTIONAL: Well developed, nontoxic 56-year-old  female,  in moderate discomfort.  VITAL SIGNS: per nursing, reviewed and noted  SKIN: exposed skin with no rashes, ulcerations or petechiae  EYES: Grossly EOMI, no icterus  ENT: Normal voice.  No posterior pharyngeal erythema exudate.  No oral pharyngeal injury.    RESPIRATORY:  No increased work of breathing. No retractions.  Chest clear to auscultation bilaterally  CARDIOVASCULAR:  regular rate and rhythm, no murmurs.  Good Peripheral pulses. Good cap refill to extremities.   GI: Abdomen soft, nontender, normal bowel sounds. No hernia. No ascites.  MUSCULOSKELETAL: Midline lumbar tenderness to palpation with mild associated paraspinal spasm.  Neurovascularly intact, no focal weakness.  Intact sensation.  No thoracic tenderness.  No neck tenderness.  NEUROLOGIC: Alert, oriented x 3. No gross deficits. GCS 15.   PSYCH: appropriate affect.  : no bladder tenderness or distention, no CVA tenderness      Procedures     No attending physician procedures were performed on this patient.      ED Course      XR Spine Lumbar 2 or 3 View    Result Date: 7/15/2022  PROCEDURE: XR SPINE LUMBAR 2 " OR 3 VIEW-  HISTORY: pain, fall  3 views  FINDINGS:  Mild compression fracture of L1 is seen, likely acute. There is approximately 30% loss of height. No significant retropulsion of bone is seen. Disc spaces are well preserved. Alignment is normal.      Impression: Acute appearing mild approximately 30% compression fracture of L1.  This report was signed and finalized on 7/15/2022 1:23 PM by Landon Nieto MD.                                         MDM  Patient presents for evaluation of pain status post fall plain films reveal mild compression fracture of L1.  No retropulsion of fragments.  Disc spaces are preserved.  Normal alignment.  Patient is neurovascularly intact, treated with Norco in emergency department.  Placed in LSO brace, prescription Norco, Flexeril, outpatient follow-up with orthopedist, return precautions were discussed.  Final diagnoses:   Compression fracture of L1 vertebra, initial encounter (HCC)       ED Disposition  ED Disposition     ED Disposition   Discharge    Condition   Stable    Comment   --             John Pendleton MD  [address] [place] [id number] [phone]      As needed, If symptoms worsen    [facility] Emergency Department  [address] [facility] [address] [phone]    As needed, If symptoms worsen         Medication List      New Prescriptions    cyclobenzaprine 5 MG tablet  Commonly known as: FLEXERIL  Take 1 tablet by mouth 3 (Three) Times a Day As Needed for Muscle Spasms.     HYDROcodone-acetaminophen 5-325 MG per tablet  Commonly known as: NORCO  Take 1 tablet by mouth Every 6 (Six) Hours As Needed for Severe Pain .           Where to Get Your Medications      These medications were sent to [id number] - [place], [place] - [address] - [phone]  - [phone] FX  [address] [place] [address]    Phone: [phone]   · cyclobenzaprine 5 MG tablet  · HYDROcodone-acetaminophen 5-325 MG  per tablet          Marcelino Villalobos, DO  07/15/22 6766

## 2022-09-20 ENCOUNTER — TELEPHONE (OUTPATIENT)
Dept: SURGERY | Facility: CLINIC | Age: 57
End: 2022-09-20

## 2022-11-09 ENCOUNTER — TELEPHONE (OUTPATIENT)
Dept: SURGERY | Facility: CLINIC | Age: 57
End: 2022-11-09

## 2022-11-09 NOTE — TELEPHONE ENCOUNTER
PT NEEDS TO R/S COLON THAT IS S/C ON 11/14/2022 DUE TO CONFLICTING APPT. PLEASE CALL PT BACK [phone]

## 2022-11-10 NOTE — TELEPHONE ENCOUNTER
Pt is r/s at [facility] for Colonoscopy to 1/20/23

## 2022-12-29 NOTE — PROGRESS NOTES
Patient: Agatha Carbajal    YOB: 1965    Date: 01/04/2023    Primary Care Provider: Fawn Fischer MD    Reason for Consultation: Lesion    Chief Complaint   Patient presents with   • Mass     Left arm       Subjective .     History of present illness:  I saw the patient in the office  today as a consultation for evaluation and treatment of a mass on the arm.  She states onset was 3 weeks ago. She states that she fell in July and hit her arm into the door facing.  Patient had a large bruise of the left arm and left chest wall is resolved completely.  She had COVID and developed an area of inflammation on the left upper arm.  She feels an area of banding and tenderness.  Patient otherwise doing well with no other complaints.  History of breast cancer with no evidence of recurrence.  Last colonoscopy over 5 years ago, has a history of breast cancer and needs further screening of the colon.    The following portions of the patient's history were reviewed and updated as appropriate: allergies, current medications, past family history, past medical history, past social history, past surgical history and problem list.    Review of Systems   Constitutional: Negative for chills, fever and unexpected weight change.   HENT: Negative for hearing loss, trouble swallowing and voice change.    Eyes: Negative for visual disturbance.   Respiratory: Negative for apnea, cough, chest tightness, shortness of breath and wheezing.    Cardiovascular: Negative for chest pain, palpitations and leg swelling.   Gastrointestinal: Negative for abdominal distention, abdominal pain, anal bleeding, blood in stool, constipation, diarrhea, nausea, rectal pain and vomiting.   Endocrine: Negative for cold intolerance and heat intolerance.   Genitourinary: Negative for difficulty urinating, dysuria and flank pain.   Musculoskeletal: Negative for back pain and gait problem.   Skin: Negative for color change, rash and wound.   Neurological:  Negative for dizziness, syncope, speech difficulty, weakness, light-headedness, numbness and headaches.   Hematological: Negative for adenopathy. Does not bruise/bleed easily.   Psychiatric/Behavioral: Negative for confusion. The patient is not nervous/anxious.        History:  Past Medical History:   Diagnosis Date   • Acid reflux    • Arthritis    • Bilateral ovarian cysts    • Breast cancer (HCC) 2009    RIGHT   • Bronchitis    • Kidney stone    • Migraine headache    • MVP (mitral valve prolapse)    • Osteoporosis    • Piercing     EARS ONLY   • Skin cancer     BASAL CELL ON CHEST, SQUAMOUS CELL RIGHT ARM   • Tinnitus    • Wears glasses     TO READ       Past Surgical History:   Procedure Laterality Date   • BREAST AUGMENTATION Bilateral 2010   • BREAST BIOPSY Right    • COLONOSCOPY  2016   • ENDOSCOPY N/A 2/15/2019    Procedure: ESOPHAGOGASTRODUODENOSCOPY WITH COLD FORCEP BIOPSIES;  Surgeon: Mick Triplett MD;  Location: [facility] ENDOSCOPY;  Service: Gastroenterology   • HYSTERECTOMY      COMPLETE   • MASTECTOMY Bilateral 2009   • SKIN BIOPSY      chest, right arm       Family History   Problem Relation Age of Onset   • Arthritis Mother    • Migraines Mother    • Prostate cancer Father    • Hypertension Father    • Kidney disease Father    • Obesity Father    • Breast cancer Sister    • Heart disease Maternal Grandmother    • Osteoporosis Maternal Grandmother    • Heart disease Paternal Grandfather    • Tuberculosis Paternal Grandfather        Social History     Tobacco Use   • Smoking status: Never   • Smokeless tobacco: Never   Substance Use Topics   • Alcohol use: No   • Drug use: No        Allergies:  Allergies   Allergen Reactions   • Effexor [Venlafaxine] Other (See Comments)     Weird sensation   • Bactrim [Sulfamethoxazole-Trimethoprim] Rash   • Demerol [Meperidine] Nausea And Vomiting   • Oxycodone Other (See Comments)     HEADACHE         Medications:    Current Outpatient Medications:   •  Ascorbic  Acid (Vitamin C) 100 MG chewable tablet, Vitamin C, Disp: , Rfl:   •  calcium citrate-vitamin d (CALCITRATE) 315-250 MG-UNIT tablet tablet, Take 2 tablets by mouth Daily., Disp: , Rfl:   •  Cranberry 1000 MG capsule, cranberry, Disp: , Rfl:   •  fexofenadine (ALLEGRA) 180 MG tablet, Take 180 mg by mouth Daily. 1/2 diaily, Disp: , Rfl:   •  SUMAtriptan (IMITREX) 100 MG tablet, Take 1 tablet by mouth 1 (One) Time As Needed for Migraine. Take one tablet at onset of headache. May repeat dose one time in 2 hours if headache not relieved., Disp: 9 tablet, Rfl: 3  •  cyclobenzaprine (FLEXERIL) 5 MG tablet, Take 1 tablet by mouth 3 (Three) Times a Day As Needed for Muscle Spasms., Disp: 15 tablet, Rfl: 0    Objective     Vital Signs:   Vitals:    01/04/23 0851   BP: 138/78   Pulse: 80   Resp: 18   Temp: 98.2 °F (36.8 °C)   TempSrc: Temporal   SpO2: 98%   Weight: 84.2 kg (185 lb 9.6 oz)   Height: 172.7 cm (68\")       Physical Exam:  Lungs:     Clear to auscultation,respirations regular, even and                  unlabored    Heart:    Regular rhythm and normal rate, normal S1 and S2, no            murmur      General Appearance:    Alert, cooperative, in no acute distress   Head:    Normocephalic, without obvious abnormality, atraumatic   Eyes:            Lids and lashes normal, conjunctivae and sclerae normal, no   icterus, no pallor, corneas clear.   Ears:    Ears appear intact with no abnormalities noted   Throat:   No oral lesions, no thrush, oral mucosa moist   Neck:   No adenopathy, supple, trachea midline, no thyromegaly, no   carotid bruit.   Extremities:   Moves all extremities well, no edema, no cyanosis, no             Redness.    Pulses:   Pulses palpable and equal bilaterally   Skin:   No bleeding, bruising or rash. Lesion left upper arm, appears to be a fibrous band.  No definite mass identified.   Lymph nodes:   No palpable adenopathy   Neurologic:   Cranial nerves 2 - 12 grossly intact, sensation intact.      Results Review:   I reviewed the patient's new clinical results.    Review of Systems was reviewed and confirmed as accurate as documented by the MA.    Assessment & Plan     1. Arm mass, left    2. History of breast cancer    3. Screening for colon cancer        I did have a detailed and extensive discussion with the patient in the hospital and they understand that they do not need excision of this mass.  Appears to be a fibrous band that may resolve over the next year.  Continue yearly mammograms and follow-up of breast cancer.  Patient scheduled for colonoscopy for screening purposes in 1 month.. Full risks and benefits of operative versus nonoperative intervention were discussed with the patient and these include bleeding, infection and reccurrence. The patient understands, agrees, and wishes to proceed with the surgical treatment plan as mentioned above. The patient had no questions for me at the end of the discussion.  Risk of bleeding perforation discussed and patient agreeable.     I discussed the patients findings and my recommendations with patient and consulting provider.    Electronically signed by Mick Triplett MD  01/04/23  09:22 EST

## 2023-01-04 ENCOUNTER — OFFICE VISIT (OUTPATIENT)
Dept: SURGERY | Facility: CLINIC | Age: 58
End: 2023-01-04
Payer: COMMERCIAL

## 2023-01-04 ENCOUNTER — PREP FOR SURGERY (OUTPATIENT)
Dept: OTHER | Facility: HOSPITAL | Age: 58
End: 2023-01-04
Payer: COMMERCIAL

## 2023-01-04 VITALS
OXYGEN SATURATION: 98 % | SYSTOLIC BLOOD PRESSURE: 138 MMHG | WEIGHT: 185.6 LBS | DIASTOLIC BLOOD PRESSURE: 78 MMHG | RESPIRATION RATE: 18 BRPM | HEART RATE: 80 BPM | BODY MASS INDEX: 28.13 KG/M2 | TEMPERATURE: 98.2 F | HEIGHT: 68 IN

## 2023-01-04 DIAGNOSIS — Z85.3 HISTORY OF BREAST CANCER: ICD-10-CM

## 2023-01-04 DIAGNOSIS — R22.32 ARM MASS, LEFT: Primary | ICD-10-CM

## 2023-01-04 DIAGNOSIS — Z12.11 SCREENING FOR COLON CANCER: ICD-10-CM

## 2023-01-04 DIAGNOSIS — Z12.11 COLON CANCER SCREENING: Primary | ICD-10-CM

## 2023-01-04 PROCEDURE — 99203 OFFICE O/P NEW LOW 30 MIN: CPT | Performed by: SURGERY

## 2023-01-10 ENCOUNTER — TELEPHONE (OUTPATIENT)
Dept: SURGERY | Facility: CLINIC | Age: 58
End: 2023-01-10
Payer: COMMERCIAL

## 2023-01-11 ENCOUNTER — TELEPHONE (OUTPATIENT)
Dept: INTERNAL MEDICINE | Facility: CLINIC | Age: 58
End: 2023-01-11
Payer: COMMERCIAL

## 2023-01-11 NOTE — TELEPHONE ENCOUNTER
Caller: Agatha Carbajal    Relationship: Self    Best call back number: [phone]      What specialty or service is being requested: MRI WITH CONTRAST OR CT    Any additional details:       [facility] DID AN MRI AND IT SHOWED A CYST ON HER KIDNEY    CAN ANOTHER MRI BE REQUESTED WITH CONTRAST?    PATIENT IS CONTACTING [facility] TO HAVE THE MRI WITH AND WITHOUT CONTRAST SENT OVER TO THE OFFICE    THE MRI WITH [facility] WAS FOR HER BACK AND THEY COULD BARELY SEE THE KIDNEY SO THIS NEXT MRI WITH CONTRAST OR CT NEEDS TO BE DONE OF KIDNEY    PATIENT HAS PHYSICAL ON 2/28 AND WANTS TO KNOW IF SHE CAN DO AN APPOINTMENT FOR KIDNEY SAME TIME AS PHYSICAL

## 2023-01-12 ENCOUNTER — OFFICE VISIT (OUTPATIENT)
Dept: INTERNAL MEDICINE | Facility: CLINIC | Age: 58
End: 2023-01-12
Payer: COMMERCIAL

## 2023-01-12 VITALS
SYSTOLIC BLOOD PRESSURE: 122 MMHG | OXYGEN SATURATION: 99 % | WEIGHT: 185 LBS | HEART RATE: 87 BPM | RESPIRATION RATE: 15 BRPM | DIASTOLIC BLOOD PRESSURE: 74 MMHG | TEMPERATURE: 98 F | HEIGHT: 68 IN | BODY MASS INDEX: 28.04 KG/M2

## 2023-01-12 DIAGNOSIS — N28.1 RENAL CYST, RIGHT: Primary | ICD-10-CM

## 2023-01-12 DIAGNOSIS — E78.2 MIXED HYPERLIPIDEMIA: ICD-10-CM

## 2023-01-12 PROCEDURE — 99213 OFFICE O/P EST LOW 20 MIN: CPT | Performed by: INTERNAL MEDICINE

## 2023-01-12 NOTE — PROGRESS NOTES
"Chief Complaint  Hyperlipidemia (Discuss spot on kidney(scans maybe back in 2019))    Subjective        Agatha Carbajal presents to [facility]  History of Present Illness    patient complains of  Back pain for which she saw  Orthopedist and had mri done which she states the pa saw a right renal cyst which she also noted was there on ct done 2019 , she was concerned about the renal cyst, patient also states that she had covid , she is due for labs for cholesterol  Answers for HPI/ROS submitted by the patient on 1/11/2023  Please describe your symptoms.: Had an MRI on my back due to a fracture and the PA noticed a small cyst on the right kidney.  The PA indicated that it was barely in view on the MRI so I might want to follow up with my primary care doc.  It was not noted from the radiologist report.  A small cyst was also noted on the right kidney in a scan in 2019, but was not concerning.  Have you had these symptoms before?: No  How long have you been having these symptoms?: 1-4 days  Please list any medications you are currently taking for this condition.: None  What is the primary reason for your visit?: Other      Objective   Vital Signs:  /74 (BP Location: Right arm, Patient Position: Sitting, Cuff Size: Adult)   Pulse 87   Temp 98 °F (36.7 °C)   Resp 15   Ht 172.7 cm (67.99\")   Wt 83.9 kg (185 lb)   SpO2 99%   BMI 28.14 kg/m²   Estimated body mass index is 28.14 kg/m² as calculated from the following:    Height as of this encounter: 172.7 cm (67.99\").    Weight as of this encounter: 83.9 kg (185 lb).       BMI is >= 25 and <30. (Overweight) The following options were offered after discussion;: weight loss educational material (shared in after visit summary), exercise counseling/recommendations and nutrition counseling/recommendations      Physical Exam  Vitals and nursing note reviewed.   Constitutional:       General: She is not in acute distress.     " Appearance: Normal appearance. She is not diaphoretic.   HENT:      Head: Normocephalic and atraumatic.      Right Ear: External ear normal.      Left Ear: External ear normal.      Nose: Nose normal.   Eyes:      Extraocular Movements: Extraocular movements intact.      Conjunctiva/sclera: Conjunctivae normal.   Neck:      Trachea: Trachea normal.   Cardiovascular:      Rate and Rhythm: Normal rate and regular rhythm.      Heart sounds: Normal heart sounds.   Pulmonary:      Effort: Pulmonary effort is normal. No respiratory distress.      Breath sounds: Normal breath sounds.   Abdominal:      General: Abdomen is flat.   Musculoskeletal:      Cervical back: Neck supple.      Comments: Moves all limbs   Skin:     General: Skin is warm.   Neurological:      Mental Status: She is alert and oriented to person, place, and time.      Comments: No gross motor or sensory deficits        Result Review :        CT Abdomen Pelvis Without Contrast (06/02/2019 13:37)             Assessment and Plan   Diagnoses and all orders for this visit:    1. Renal cyst, right (Primary)  -     US Renal Bilateral  -     Ambulatory Referral to Urology    2. Mixed hyperlipidemia  -     CBC & Differential  -     Comprehensive Metabolic Panel  -     Lipid Panel    Plan:  1.   Renal cyst: We will obtain ultrasound and refer patient to urology , CT reviewed  2.mixed hyperlipidemia: will obtain   fasting CMP and lipid panel.  Diet and exercise counseled,          I spent 20 minutes caring for Agatha on this date of service. This time includes time spent by me in the following activities:preparing for the visit, reviewing tests, performing a medically appropriate examination and/or evaluation , counseling and educating the patient/family/caregiver, ordering medications, tests, or procedures and documenting information in the medical record  Follow Up   No follow-ups on file.  Patient was given instructions and counseling regarding her condition or for  health maintenance advice. Please see specific information pulled into the AVS if appropriate.

## 2023-01-19 ENCOUNTER — HOSPITAL ENCOUNTER (OUTPATIENT)
Dept: ULTRASOUND IMAGING | Facility: HOSPITAL | Age: 58
Discharge: HOME | End: 2023-01-19
Admitting: INTERNAL MEDICINE
Payer: COMMERCIAL

## 2023-01-19 PROCEDURE — 76775 US EXAM ABDO BACK WALL LIM: CPT

## 2023-01-30 ENCOUNTER — OFFICE VISIT (OUTPATIENT)
Dept: UROLOGY | Facility: CLINIC | Age: 58
End: 2023-01-30
Payer: COMMERCIAL

## 2023-01-30 VITALS
HEIGHT: 68 IN | SYSTOLIC BLOOD PRESSURE: 118 MMHG | OXYGEN SATURATION: 98 % | RESPIRATION RATE: 19 BRPM | HEART RATE: 81 BPM | TEMPERATURE: 98.1 F | DIASTOLIC BLOOD PRESSURE: 84 MMHG | BODY MASS INDEX: 28.04 KG/M2 | WEIGHT: 185 LBS

## 2023-01-30 DIAGNOSIS — N28.1 RENAL CYST: Primary | ICD-10-CM

## 2023-01-30 LAB
BILIRUB BLD-MCNC: NEGATIVE MG/DL
CLARITY, POC: CLEAR
COLOR UR: ABNORMAL
EXPIRATION DATE: ABNORMAL
GLUCOSE UR STRIP-MCNC: NEGATIVE MG/DL
KETONES UR QL: NEGATIVE
LEUKOCYTE EST, POC: NEGATIVE
Lab: ABNORMAL
NITRITE UR-MCNC: NEGATIVE MG/ML
PH UR: 6 [PH] (ref 5–8)
PROT UR STRIP-MCNC: ABNORMAL MG/DL
RBC # UR STRIP: ABNORMAL /UL
SP GR UR: 1.02 (ref 1–1.03)
UROBILINOGEN UR QL: NORMAL

## 2023-01-30 PROCEDURE — 99213 OFFICE O/P EST LOW 20 MIN: CPT | Performed by: NURSE PRACTITIONER

## 2023-01-30 PROCEDURE — 81003 URINALYSIS AUTO W/O SCOPE: CPT | Performed by: NURSE PRACTITIONER

## 2023-01-30 NOTE — PROGRESS NOTES
Office Visit Renal Mass Female      Patient Name: Agatha Carbajal  : 1965   MRN: [id number]     Chief Complaint: Renal Mass. Right    Chief Complaint   Patient presents with   • Renal Cyst       Referring Provider: Fawn Fischer MD    History of Present Illness: Agatha Carbajal is a 57 y.o. female who presents today for evaluation of a right renal cyst.    The patient initially presented asymptomatic    Evaluation included CT without contrast from 2019 and recent renal ultrasound  This showed a 2.7 cm Right renal cyst    Past  history is negative for UTI, kidney stones or other renal diseases.       Subjective      Review of System:   Constitutional: No fevers or chills      Past Medical History:   Past Medical History:   Diagnosis Date   • Acid reflux    • Arthritis    • Bilateral ovarian cysts    • Breast cancer (HCC)     RIGHT   • Bronchitis    • Kidney stone    • Migraine headache    • MVP (mitral valve prolapse)    • Osteoporosis    • Piercing     EARS ONLY   • Skin cancer     BASAL CELL ON CHEST, SQUAMOUS CELL RIGHT ARM   • Tinnitus    • Wears glasses     TO READ       Past Surgical History:   Past Surgical History:   Procedure Laterality Date   • BREAST AUGMENTATION Bilateral    • BREAST BIOPSY Right    • COLONOSCOPY  2016   • ENDOSCOPY N/A 2/15/2019    Procedure: ESOPHAGOGASTRODUODENOSCOPY WITH COLD FORCEP BIOPSIES;  Surgeon: Mick Triplett MD;  Location: [facility] ENDOSCOPY;  Service: Gastroenterology   • HYSTERECTOMY      COMPLETE   • MASTECTOMY Bilateral    • SKIN BIOPSY      chest, right arm       Family History:   Family History   Problem Relation Age of Onset   • Arthritis Mother    • Migraines Mother    • Prostate cancer Father    • Hypertension Father    • Kidney disease Father    • Obesity Father    • Breast cancer Sister    • Heart disease Maternal Grandmother    • Osteoporosis Maternal Grandmother    • Heart disease Paternal Grandfather    • Tuberculosis Paternal Grandfather  "       Social History:   Social History     Socioeconomic History   • Marital status:    Tobacco Use   • Smoking status: Never   • Smokeless tobacco: Never   Substance and Sexual Activity   • Alcohol use: No   • Drug use: No   • Sexual activity: Defer       Medications:     Current Outpatient Medications:   •  Ascorbic Acid (Vitamin C) 100 MG chewable tablet, Vitamin C, Disp: , Rfl:   •  calcium citrate-vitamin d (CALCITRATE) 315-250 MG-UNIT tablet tablet, Take 2 tablets by mouth Daily., Disp: , Rfl:   •  Cranberry 1000 MG capsule, cranberry, Disp: , Rfl:   •  cyclobenzaprine (FLEXERIL) 5 MG tablet, Take 1 tablet by mouth 3 (Three) Times a Day As Needed for Muscle Spasms., Disp: 15 tablet, Rfl: 0  •  fexofenadine (ALLEGRA) 180 MG tablet, Take 180 mg by mouth Daily. 1/2 diaily, Disp: , Rfl:   •  Sodium Sulfate-Mag Sulfate-KCl 7746-910-064 MG tablet, Take 12 tablets by mouth 2 (Two) Times a Day. Use as directed, Disp: 24 tablet, Rfl: 0  •  SUMAtriptan (IMITREX) 100 MG tablet, Take 1 tablet by mouth 1 (One) Time As Needed for Migraine. Take one tablet at onset of headache. May repeat dose one time in 2 hours if headache not relieved., Disp: 9 tablet, Rfl: 3    Allergies:   Allergies   Allergen Reactions   • Effexor [Venlafaxine] Other (See Comments)     Weird sensation   • Bactrim [Sulfamethoxazole-Trimethoprim] Rash   • Demerol [Meperidine] Nausea And Vomiting   • Oxycodone Other (See Comments)     HEADACHE         Objective     Physical Exam:   Vital Signs:   Vitals:    01/30/23 1145   BP: 118/84   BP Location: Right arm   Patient Position: Sitting   Cuff Size: Adult   Pulse: 81   Resp: 19   Temp: 98.1 °F (36.7 °C)   TempSrc: Temporal   SpO2: 98%   Weight: 83.9 kg (185 lb)   Height: 172.7 cm (67.99\")     Body mass index is 28.14 kg/m².     Constitutional: NAD, WDWN.   Neurological: A + O x 3.    Extremities: FABIAN x 4  Psychiatric:  Normal mood and affect      Assessment / Plan      Assessment  Ms. Carbajal is a 57 " y.o. female who presents with a right renal cyst incidentally discovered due to CT in 2019 for questionable kidney stone and Dr. Fischer had evaluated with renal ultrasound. We discussed the best imaging to confirm benign cyst is CT with/without contrast to see if it enhances. She agrees to imaging. UA today is benign and patient has no complaints of symptoms      Plan  1.  CT renal mass protocol follow up with     Follow Up:   Return in about 4 weeks (around 2/27/2023) for phone visit, follow up Gary, Imaging prior to next visit.    AVELINO Keller, NP-C  [facility]

## 2023-02-03 ENCOUNTER — TELEPHONE (OUTPATIENT)
Dept: UROLOGY | Facility: CLINIC | Age: 58
End: 2023-02-03

## 2023-02-07 ENCOUNTER — HOSPITAL ENCOUNTER (OUTPATIENT)
Dept: CT IMAGING | Facility: HOSPITAL | Age: 58
Discharge: HOME | End: 2023-02-07
Admitting: NURSE PRACTITIONER
Payer: COMMERCIAL

## 2023-02-07 DIAGNOSIS — N28.1 RENAL CYST: ICD-10-CM

## 2023-02-07 PROCEDURE — 74170 CT ABD WO CNTRST FLWD CNTRST: CPT

## 2023-02-07 PROCEDURE — 0 IOPAMIDOL PER 1 ML: Performed by: NURSE PRACTITIONER

## 2023-02-07 RX ADMIN — IOPAMIDOL 95 ML: 755 INJECTION, SOLUTION INTRAVENOUS at 08:53

## 2023-02-09 ENCOUNTER — OFFICE VISIT (OUTPATIENT)
Dept: UROLOGY | Facility: CLINIC | Age: 58
End: 2023-02-09
Payer: COMMERCIAL

## 2023-02-09 DIAGNOSIS — N28.1 RENAL CYST: Primary | ICD-10-CM

## 2023-02-09 PROCEDURE — 99441 PR PHYS/QHP TELEPHONE EVALUATION 5-10 MIN: CPT | Performed by: UROLOGY

## 2023-02-09 NOTE — PROGRESS NOTES
CT Abdomen With & Without Contrast  Result Date: 2/7/2023  Impression: 1. Benign 2.2 cm right renal cyst. 2. There are no suspicious enhancing renal masses. 3. Chronic L1 compression fracture. Electronically Signed: Beatriz Nunn  2/7/2023 11:24 PM EST  Workstation ID: [id number]    5-minute telephone conversation.  I personally reviewed the CT scan and reassured the patient that her cyst appears quite benign.  She will follow-up with us as needed.    You have chosen to receive care through a telephone visit. Do you consent to use a telephone visit for your medical care today? Yes

## 2023-02-21 ENCOUNTER — OFFICE VISIT (OUTPATIENT)
Dept: INTERNAL MEDICINE | Facility: CLINIC | Age: 58
End: 2023-02-21
Payer: COMMERCIAL

## 2023-02-21 VITALS
HEIGHT: 68 IN | HEART RATE: 77 BPM | TEMPERATURE: 98 F | OXYGEN SATURATION: 98 % | BODY MASS INDEX: 28.01 KG/M2 | WEIGHT: 184.8 LBS | SYSTOLIC BLOOD PRESSURE: 116 MMHG | DIASTOLIC BLOOD PRESSURE: 82 MMHG

## 2023-02-21 DIAGNOSIS — J20.9 ACUTE BRONCHITIS, UNSPECIFIED ORGANISM: Primary | ICD-10-CM

## 2023-02-21 PROCEDURE — 99213 OFFICE O/P EST LOW 20 MIN: CPT | Performed by: NURSE PRACTITIONER

## 2023-02-21 RX ORDER — DEXTROMETHORPHAN HYDROBROMIDE AND PROMETHAZINE HYDROCHLORIDE 15; 6.25 MG/5ML; MG/5ML
5 SYRUP ORAL NIGHTLY PRN
Qty: 473 ML | Refills: 0 | Status: SHIPPED | OUTPATIENT
Start: 2023-02-21

## 2023-02-21 RX ORDER — BUDESONIDE AND FORMOTEROL FUMARATE DIHYDRATE 80; 4.5 UG/1; UG/1
2 AEROSOL RESPIRATORY (INHALATION)
Qty: 6.9 G | Refills: 1 | Status: SHIPPED | OUTPATIENT
Start: 2023-02-21

## 2023-02-21 RX ORDER — ALBUTEROL SULFATE 90 UG/1
AEROSOL, METERED RESPIRATORY (INHALATION)
COMMUNITY
Start: 2023-02-04

## 2023-02-21 NOTE — PROGRESS NOTES
"  Office Visit      Patient Name: Agatha Carbajal  : 1965   MRN: [id number]   Care Team: Patient Care Team:  Fawn Fischer MD as PCP - General (Internal Medicine)    Chief Complaint  Cough (Lingering the last 3 weeks)    Subjective     Subjective      Agatha Carbajal presents to [facility] for cough.   Symptoms started about 3 weeks ago.   Has been to [facility] twice and given oral and IM steroids with amoxicillin. She has also tried an albuterol inhaler. Never been diagnosed with asthma. Checking oxygen saturation at home and normal.  Congestion has improved however she continues to have a bothersome cough.   Other than that she feels overall well.  She denies any shortness of breath, chest pain, wheezing, fever, rash, and productive sputum.  Of note she did have COVID-19 in 2022 and has had waxing and waning cough since then.    Objective     Objective   Vital Signs:   /82   Pulse 77   Temp 98 °F (36.7 °C)   Ht 172.7 cm (67.99\")   Wt 83.8 kg (184 lb 12.8 oz)   SpO2 98%   BMI 28.11 kg/m²     Physical Exam  Constitutional:       General: She is not in acute distress.     Appearance: Normal appearance. She is not ill-appearing or toxic-appearing.   HENT:      Right Ear: Tympanic membrane and ear canal normal. No middle ear effusion. Tympanic membrane is not bulging.      Left Ear: Tympanic membrane and ear canal normal.  No middle ear effusion. Tympanic membrane is not bulging.      Nose: Nose normal. No congestion or rhinorrhea.      Right Sinus: No maxillary sinus tenderness or frontal sinus tenderness.      Left Sinus: No maxillary sinus tenderness or frontal sinus tenderness.      Mouth/Throat:      Mouth: Mucous membranes are moist.      Pharynx: No posterior oropharyngeal erythema.   Eyes:      Pupils: Pupils are equal, round, and reactive to light.   Cardiovascular:      Rate and Rhythm: Normal rate and regular rhythm.      Heart sounds: Normal heart sounds. No " murmur heard.  Pulmonary:      Effort: Pulmonary effort is normal. No respiratory distress.      Breath sounds: Normal breath sounds. No wheezing.      Comments: Coughing throughout exam  Abdominal:      General: Bowel sounds are normal. There is no distension.      Palpations: Abdomen is soft.      Tenderness: There is no abdominal tenderness.   Musculoskeletal:      Cervical back: Neck supple. No tenderness.   Lymphadenopathy:      Head:      Right side of head: No submental, submandibular or tonsillar adenopathy.      Left side of head: No submental, submandibular or tonsillar adenopathy.      Cervical: No cervical adenopathy.   Skin:     General: Skin is warm and dry.      Findings: No rash.   Neurological:      Mental Status: She is alert.   Psychiatric:         Mood and Affect: Mood normal.         Behavior: Behavior normal.       Assessment / Plan      Assessment & Plan   Problem List Items Addressed This Visit    None  Visit Diagnoses     Acute bronchitis, unspecified organism    -  Primary    Relevant Medications    albuterol sulfate  (90 Base) MCG/ACT inhaler    budesonide-formoterol (Symbicort) 80-4.5 MCG/ACT inhaler    promethazine-dextromethorphan (PROMETHAZINE-DM) 6.25-15 MG/5ML syrup    Discussed likely of viral bronchitis versus component of reactive airway related to COVID-19.  Recommend Promethazine DM as needed for bothersome cough at night and will add Symbicort twice daily for short period of time, she has been advised to rinse mouth thoroughly after use to prevent thrush.  Continue albuterol as needed.  Follow-up if not improving, may need to consider PFTs in the future.         Follow Up   Return if symptoms worsen or fail to improve.  Patient was given instructions and counseling regarding her condition or for health maintenance advice. Please see specific information pulled into the AVS if appropriate.     AVELINO Damon  [facility]

## 2023-02-27 LAB
ALBUMIN SERPL-MCNC: 4.4 G/DL (ref 3.5–5.2)
ALBUMIN/GLOB SERPL: 1.8 G/DL
ALP SERPL-CCNC: 111 U/L (ref 39–117)
ALT SERPL-CCNC: 10 U/L (ref 1–33)
AST SERPL-CCNC: 13 U/L (ref 1–32)
BASOPHILS # BLD AUTO: 0.04 10*3/MM3 (ref 0–0.2)
BASOPHILS NFR BLD AUTO: 0.8 % (ref 0–1.5)
BILIRUB SERPL-MCNC: 0.7 MG/DL (ref 0–1.2)
BUN SERPL-MCNC: 13 MG/DL (ref 6–20)
BUN/CREAT SERPL: 20 (ref 7–25)
CALCIUM SERPL-MCNC: 9.2 MG/DL (ref 8.6–10.5)
CHLORIDE SERPL-SCNC: 105 MMOL/L (ref 98–107)
CHOLEST SERPL-MCNC: 208 MG/DL (ref 0–200)
CO2 SERPL-SCNC: 28 MMOL/L (ref 22–29)
CREAT SERPL-MCNC: 0.65 MG/DL (ref 0.57–1)
EGFRCR SERPLBLD CKD-EPI 2021: 103.5 ML/MIN/1.73
EOSINOPHIL # BLD AUTO: 0.17 10*3/MM3 (ref 0–0.4)
EOSINOPHIL NFR BLD AUTO: 3.5 % (ref 0.3–6.2)
ERYTHROCYTE [DISTWIDTH] IN BLOOD BY AUTOMATED COUNT: 13 % (ref 12.3–15.4)
GLOBULIN SER CALC-MCNC: 2.5 GM/DL
GLUCOSE SERPL-MCNC: 97 MG/DL (ref 65–99)
HCT VFR BLD AUTO: 37.7 % (ref 34–46.6)
HDLC SERPL-MCNC: 64 MG/DL (ref 40–60)
HGB BLD-MCNC: 12.5 G/DL (ref 12–15.9)
IMM GRANULOCYTES # BLD AUTO: 0.01 10*3/MM3 (ref 0–0.05)
IMM GRANULOCYTES NFR BLD AUTO: 0.2 % (ref 0–0.5)
LDLC SERPL CALC-MCNC: 127 MG/DL (ref 0–100)
LYMPHOCYTES # BLD AUTO: 1.69 10*3/MM3 (ref 0.7–3.1)
LYMPHOCYTES NFR BLD AUTO: 34.6 % (ref 19.6–45.3)
MCH RBC QN AUTO: 28.7 PG (ref 26.6–33)
MCHC RBC AUTO-ENTMCNC: 33.2 G/DL (ref 31.5–35.7)
MCV RBC AUTO: 86.7 FL (ref 79–97)
MONOCYTES # BLD AUTO: 0.34 10*3/MM3 (ref 0.1–0.9)
MONOCYTES NFR BLD AUTO: 7 % (ref 5–12)
NEUTROPHILS # BLD AUTO: 2.64 10*3/MM3 (ref 1.7–7)
NEUTROPHILS NFR BLD AUTO: 53.9 % (ref 42.7–76)
NRBC BLD AUTO-RTO: 0 /100 WBC (ref 0–0.2)
PLATELET # BLD AUTO: 267 10*3/MM3 (ref 140–450)
POTASSIUM SERPL-SCNC: 4.2 MMOL/L (ref 3.5–5.2)
PROT SERPL-MCNC: 6.9 G/DL (ref 6–8.5)
RBC # BLD AUTO: 4.35 10*6/MM3 (ref 3.77–5.28)
SODIUM SERPL-SCNC: 140 MMOL/L (ref 136–145)
TRIGL SERPL-MCNC: 97 MG/DL (ref 0–150)
VLDLC SERPL CALC-MCNC: 17 MG/DL (ref 5–40)
WBC # BLD AUTO: 4.89 10*3/MM3 (ref 3.4–10.8)

## 2023-02-28 ENCOUNTER — OFFICE VISIT (OUTPATIENT)
Dept: INTERNAL MEDICINE | Facility: CLINIC | Age: 58
End: 2023-02-28
Payer: COMMERCIAL

## 2023-02-28 VITALS
TEMPERATURE: 97.8 F | RESPIRATION RATE: 16 BRPM | OXYGEN SATURATION: 99 % | SYSTOLIC BLOOD PRESSURE: 134 MMHG | BODY MASS INDEX: 28.34 KG/M2 | WEIGHT: 187 LBS | DIASTOLIC BLOOD PRESSURE: 82 MMHG | HEIGHT: 68 IN | HEART RATE: 63 BPM

## 2023-02-28 DIAGNOSIS — Z00.00 ROUTINE GENERAL MEDICAL EXAMINATION AT A HEALTH CARE FACILITY: Primary | ICD-10-CM

## 2023-02-28 PROCEDURE — 99396 PREV VISIT EST AGE 40-64: CPT | Performed by: INTERNAL MEDICINE

## 2023-03-15 ENCOUNTER — TELEPHONE (OUTPATIENT)
Dept: SURGERY | Facility: CLINIC | Age: 58
End: 2023-03-15
Payer: COMMERCIAL

## 2023-03-15 NOTE — PRE-PROCEDURE INSTRUCTIONS
PAT phone history completed with pt for upcoming procedure on 3/20/23, with Dr. Triplett.     PAT PASS GIVEN/REVIEWED WITH PT.  VERBALIZED UNDERSTANDING OF THE FOLLOWING:  DO NOT EAT, DRINK, SMOKE, USE SMOKELESS TOBACCO OR CHEW GUM AFTER MIDNIGHT THE NIGHT BEFORE SURGERY.  THIS ALSO INCLUDES HARD CANDIES AND MINTS.    DO NOT SHAVE THE AREA TO BE OPERATED ON AT LEAST 48 HOURS PRIOR TO THE PROCEDURE.  DO NOT WEAR MAKE UP OR NAIL POLISH.  DO NOT LEAVE IN ANY PIERCING OR WEAR JEWELRY THE DAY OF SURGERY.      DO NOT USE ADHESIVES IF YOU WEAR DENTURES.    DO NOT WEAR EYE CONTACTS; BRING IN YOUR GLASSES.    ONLY TAKE MEDICATION THE MORNING OF YOUR PROCEDURE IF INSTRUCTED BY YOUR SURGEON WITH ENOUGH WATER TO SWALLOW THE MEDICATION.  IF YOUR SURGEON DID NOT SPECIFY WHICH MEDICATIONS TO TAKE, YOU WILL NEED TO CALL THEIR OFFICE FOR FURTHER INSTRUCTIONS AND DO AS THEY INSTRUCT.    LEAVE ANYTHING YOU CONSIDER VALUABLE AT HOME.    YOU WILL NEED TO ARRANGE FOR SOMEONE TO DRIVE YOU HOME AFTER SURGERY.  IT IS RECOMMENDED THAT YOU DO NOT DRIVE, WORK, DRINK ALCOHOL OR MAKE MAJOR DECISIONS FOR AT LEAST 24 HOURS AFTER YOUR PROCEDURE IS COMPLETE.      THE DAY OF YOUR PROCEDURE, BRING IN THE FOLLOWING IF APPLICABLE:   PICTURE ID AND INSURANCE/MEDICARE OR MEDICAID CARDS/ANY CO-PAY THAT MAY BE DUE   COPY OF ADVANCED DIRECTIVE/LIVING WILL/POWER OR    CPAP/BIPAP/INHALERS   SKIN PREP SHEET   YOUR PREADMISSION TESTING PASS (IF NOT A PHONE HISTORY)

## 2023-03-20 ENCOUNTER — ANESTHESIA EVENT (OUTPATIENT)
Dept: GASTROENTEROLOGY | Facility: HOSPITAL | Age: 58
End: 2023-03-20
Payer: COMMERCIAL

## 2023-03-20 ENCOUNTER — HOSPITAL ENCOUNTER (OUTPATIENT)
Facility: HOSPITAL | Age: 58
Setting detail: HOSPITAL OUTPATIENT SURGERY
Discharge: HOME | End: 2023-03-20
Attending: SURGERY | Admitting: SURGERY
Payer: COMMERCIAL

## 2023-03-20 ENCOUNTER — ANESTHESIA (OUTPATIENT)
Dept: GASTROENTEROLOGY | Facility: HOSPITAL | Age: 58
End: 2023-03-20
Payer: COMMERCIAL

## 2023-03-20 VITALS
DIASTOLIC BLOOD PRESSURE: 78 MMHG | SYSTOLIC BLOOD PRESSURE: 114 MMHG | HEART RATE: 60 BPM | TEMPERATURE: 97.8 F | RESPIRATION RATE: 17 BRPM | BODY MASS INDEX: 27.28 KG/M2 | OXYGEN SATURATION: 99 % | WEIGHT: 180 LBS | HEIGHT: 68 IN

## 2023-03-20 DIAGNOSIS — Z12.11 COLON CANCER SCREENING: ICD-10-CM

## 2023-03-20 PROCEDURE — 45384 COLONOSCOPY W/LESION REMOVAL: CPT | Performed by: SURGERY

## 2023-03-20 PROCEDURE — 25010000002 PROPOFOL 10 MG/ML EMULSION: Performed by: NURSE ANESTHETIST, CERTIFIED REGISTERED

## 2023-03-20 PROCEDURE — S0260 H&P FOR SURGERY: HCPCS | Performed by: SURGERY

## 2023-03-20 RX ORDER — SODIUM CHLORIDE 0.9 % (FLUSH) 0.9 %
10 SYRINGE (ML) INJECTION AS NEEDED
Status: DISCONTINUED | OUTPATIENT
Start: 2023-03-20 | End: 2023-03-20 | Stop reason: HOSPADM

## 2023-03-20 RX ORDER — LIDOCAINE HYDROCHLORIDE 20 MG/ML
INJECTION, SOLUTION INTRAVENOUS AS NEEDED
Status: DISCONTINUED | OUTPATIENT
Start: 2023-03-20 | End: 2023-03-20 | Stop reason: SURG

## 2023-03-20 RX ORDER — PROPOFOL 10 MG/ML
VIAL (ML) INTRAVENOUS AS NEEDED
Status: DISCONTINUED | OUTPATIENT
Start: 2023-03-20 | End: 2023-03-20 | Stop reason: SURG

## 2023-03-20 RX ORDER — SODIUM CHLORIDE, SODIUM LACTATE, POTASSIUM CHLORIDE, CALCIUM CHLORIDE 600; 310; 30; 20 MG/100ML; MG/100ML; MG/100ML; MG/100ML
1000 INJECTION, SOLUTION INTRAVENOUS CONTINUOUS
Status: DISCONTINUED | OUTPATIENT
Start: 2023-03-20 | End: 2023-03-20 | Stop reason: HOSPADM

## 2023-03-20 RX ORDER — ONDANSETRON 2 MG/ML
4 INJECTION INTRAMUSCULAR; INTRAVENOUS ONCE AS NEEDED
Status: DISCONTINUED | OUTPATIENT
Start: 2023-03-20 | End: 2023-03-20 | Stop reason: HOSPADM

## 2023-03-20 RX ADMIN — LIDOCAINE HYDROCHLORIDE 60 MG: 20 INJECTION, SOLUTION INTRAVENOUS at 10:43

## 2023-03-20 RX ADMIN — PROPOFOL 20 MG: 10 INJECTION, EMULSION INTRAVENOUS at 10:46

## 2023-03-20 RX ADMIN — PROPOFOL 150 MCG/KG/MIN: 10 INJECTION, EMULSION INTRAVENOUS at 10:43

## 2023-03-20 RX ADMIN — PROPOFOL 50 MG: 10 INJECTION, EMULSION INTRAVENOUS at 10:43

## 2023-03-20 RX ADMIN — PROPOFOL 20 MG: 10 INJECTION, EMULSION INTRAVENOUS at 10:52

## 2023-03-20 RX ADMIN — SODIUM CHLORIDE, POTASSIUM CHLORIDE, SODIUM LACTATE AND CALCIUM CHLORIDE: 600; 310; 30; 20 INJECTION, SOLUTION INTRAVENOUS at 10:39

## 2023-03-20 NOTE — ANESTHESIA POSTPROCEDURE EVALUATION
Patient: Agatha Carbajal    Procedure Summary     Date: 03/20/23 Room / Location: [facility] ENDOSCOPY 3 / [facility] ENDOSCOPY    Anesthesia Start: 1039 Anesthesia Stop: 1100    Procedure: COLONOSCOPY with polypectomy Diagnosis:       Colon cancer screening      (Colon cancer screening [Z12.11])    Surgeons: Mick Triplett MD Provider: Mathew Urbina CRNA    Anesthesia Type: MAC ASA Status: 3          Anesthesia Type: MAC    Vitals  No vitals data found for the desired time range.          Post Anesthesia Care and Evaluation    Patient location during evaluation: bedside  Patient participation: complete - patient participated  Level of consciousness: awake and alert  Pain score: 0  Pain management: satisfactory to patient    Airway patency: patent  Anesthetic complications: No anesthetic complications  PONV Status: none  Cardiovascular status: acceptable and stable  Respiratory status: acceptable  Hydration status: acceptable    Comments: Vitals signs as noted in nursing documentation as per protocol.

## 2023-03-20 NOTE — ANESTHESIA PREPROCEDURE EVALUATION
Anesthesia Evaluation     Patient summary reviewed and Nursing notes reviewed   no history of anesthetic complications:  NPO Solid Status: > 8 hours  NPO Liquid Status: > 8 hours           Airway   Mallampati: II  TM distance: >3 FB  Neck ROM: full  no difficulty expected and Possible difficult intubation  Dental - normal exam     Pulmonary - negative pulmonary ROS and normal exam   Cardiovascular - normal exam    Rhythm: regular  Rate: normal    (+) valvular problems/murmurs MVP,       Neuro/Psych  (+) headaches,    GI/Hepatic/Renal/Endo    (+)  GERD,  renal disease stones,     Musculoskeletal     Abdominal    Substance History - negative use     OB/GYN negative ob/gyn ROS         Other   arthritis,    history of cancer    ROS/Med Hx Other: Hx right breast cancer--Mastectomy                  Anesthesia Plan    ASA 3     MAC     (Pt told that intravenous sedation will be used as the primary anesthetic along with local anesthesia if necessary. Every effort will be made to make sure the patient is comfortable.     The patient was told they may or may not have recall for the procedure. It was further explained that if the MAC was not adequate that a general anesthetic with either an LMA or endotracheal tube would be required.     Will proceed with the plan of care.)  intravenous induction     Anesthetic plan, risks, benefits, and alternatives have been provided, discussed and informed consent has been obtained with: patient.        CODE STATUS:

## 2023-03-20 NOTE — H&P
Reason for Consultation:  Screening colonoscopy    Chief complaint :  Screening colonoscopy    SUBJECTIVE:    History of present illness:  I did see the patient today as a consultation for evaluation and treatment of a need for screening colonoscopy.  There are no other complaints of rectal bleed abdominal pain.  Last colonoscopy 7 years ago.  Personal history of breast cancer.  No symptoms.    Review of Systems:    Review of Systems - General ROS: negative for - chills, fatigue, fever, hot flashes, malaise or night sweats  Psychological ROS: negative for - behavioral disorder, disorientation, hallucinations, hostility or mood swings  ENT ROS: negative for - nasal polyps, oral lesions, sinus pain, sneezing or sore throat  Breast ROS: negative for - galactorrhea or new or changing breast lumps  Respiratory ROS: negative for - hemoptysis, orthopnea, pleuritic pain, sputum changes or stridor  Cardiovascular ROS: negative for - dyspnea on exertion, edema, irregular heartbeat, murmur, orthopnea, palpitations or rapid heart rate  Gastrointestinal ROS: negative for - change in stools, gas/bloating, hematemesis, melena or stool incontinence.  Genito-Urinary ROS: negative for - dysuria, genital ulcers, nocturia or pelvic pain  Musculoskeletal ROS: negative for - gait disturbance or muscle pain  Neurological ROS: negative for - dizziness, gait disturbance, memory loss, numbness/tingling or seizures      Allergies:  Allergies   Allergen Reactions   • Bactrim [Sulfamethoxazole-Trimethoprim] Rash   • Demerol [Meperidine] Nausea And Vomiting   • Effexor [Venlafaxine] Other (See Comments)     Weird sensation   • Oxycodone Other (See Comments)     HEADACHE         Medications:    Current Facility-Administered Medications:   •  lactated ringers infusion 1,000 mL, 1,000 mL, Intravenous, Continuous, Mick Triplett MD    History:  Past Medical History:   Diagnosis Date   • Acid reflux    • Arthritis    • Bilateral ovarian cysts     • Breast cancer (HCC) 2009    RIGHT   • Bronchitis    • Kidney stone    • Migraine headache    • MVP (mitral valve prolapse)    • Osteoporosis    • Piercing     EARS ONLY   • Skin cancer     BASAL CELL ON CHEST, SQUAMOUS CELL RIGHT ARM   • Tinnitus    • Wears glasses     TO READ       Past Surgical History:   Procedure Laterality Date   • BREAST AUGMENTATION Bilateral 2010   • BREAST BIOPSY Right    • COLONOSCOPY  2016   • ENDOSCOPY N/A 2/15/2019    Procedure: ESOPHAGOGASTRODUODENOSCOPY WITH COLD FORCEP BIOPSIES;  Surgeon: Mick Triplett MD;  Location: [facility] ENDOSCOPY;  Service: Gastroenterology   • HYSTERECTOMY      COMPLETE   • MASTECTOMY Bilateral 2009   • SKIN BIOPSY      chest, right arm       Family History   Problem Relation Age of Onset   • Arthritis Mother    • Migraines Mother    • Prostate cancer Father    • Hypertension Father    • Kidney disease Father    • Obesity Father    • Breast cancer Sister    • Heart disease Maternal Grandmother    • Osteoporosis Maternal Grandmother    • Heart disease Paternal Grandfather    • Tuberculosis Paternal Grandfather        Social History     Tobacco Use   • Smoking status: Never   • Smokeless tobacco: Never   Substance Use Topics   • Alcohol use: No   • Drug use: No          OBJECTIVE:    Vital Signs   Temp:  [97.6 °F (36.4 °C)] 97.6 °F (36.4 °C)  Heart Rate:  [67] 67  Resp:  [14] 14  BP: (124)/(77) 124/77    Physical Exam:     General Appearance:    Alert, cooperative, in no acute distress   Head:    Normocephalic, without obvious abnormality, atraumatic   Eyes:            Lids and lashes normal, conjunctivae and sclerae normal, no   icterus, no pallor, corneas clear, PERRLA   Ears:    Ears appear intact with no abnormalities noted   Throat:   No oral lesions, no thrush, oral mucosa moist   Neck:   No adenopathy, supple, trachea midline, no thyromegaly, no   carotid bruit, no JVD   Back:     No kyphosis present, no scoliosis present, no skin lesions,       erythema or scars, no tenderness to percussion or                   palpation,   range of motion normal   Lungs:     Clear to auscultation,respirations regular, even and                  unlabored    Heart:    Regular rhythm and normal rate, normal S1 and S2, no            murmur, no gallop, no rub, no click   Chest Wall:    No abnormalities observed   Abdomen:     Normal bowel sounds, no masses, no organomegaly, soft        non-tender, non-distended, no guarding, there is no evidence of tenderness   Extremities:   Moves all extremities well, no edema, no cyanosis, no             redness   Pulses:   Pulses palpable and equal bilaterally   Skin:   No bleeding, bruising or rash   Lymph nodes:   No palpable adenopathy   Neurologic:   Cranial nerves 2 - 12 grossly intact, sensation intact, DTR       present and equal bilaterally           ASSESSMENT/PLAN:    Screening colonoscopy      I did have a detailed and extensive discussion with the patient in the office today.  The full risks and benefits of operative versus nonoperative intervention were discussed with the paient, they understand, agree, and wish to proceed with the surgical treatment plan of colonoscopy.      Mick Triplett MD

## 2023-03-21 LAB — REF LAB TEST METHOD: NORMAL

## 2023-06-09 ENCOUNTER — OFFICE VISIT (OUTPATIENT)
Dept: CARDIOLOGY | Facility: CLINIC | Age: 58
End: 2023-06-09
Payer: COMMERCIAL

## 2023-06-09 VITALS
OXYGEN SATURATION: 97 % | HEART RATE: 76 BPM | BODY MASS INDEX: 28.19 KG/M2 | SYSTOLIC BLOOD PRESSURE: 108 MMHG | HEIGHT: 68 IN | WEIGHT: 186 LBS | DIASTOLIC BLOOD PRESSURE: 60 MMHG

## 2023-06-09 DIAGNOSIS — I34.1 MVP (MITRAL VALVE PROLAPSE): ICD-10-CM

## 2023-06-09 DIAGNOSIS — R00.2 PALPITATIONS: Primary | ICD-10-CM

## 2023-06-09 NOTE — PROGRESS NOTES
"             [facility] Cardiology Office Follow Up Note    Agatha Carbajal  [id number]  2023    Primary Care Provider: Fawn Fischer MD    Chief Complaint: Palpitations    History of Present Illness:   Mrs. Agatha Carbajal is a 57 y.o. female who presents to the Cardiology Clinic for follow up of palpitations.  The patient has a past medical history significant for GERD and prior breast cancer.  She has a past cardiac history reportedly significant for mild mitral valve prolapse.  She presents to cardiology clinic today for evaluation of palpitations.  The patient reports intermittent episodes of a brief \"fluttering\" sensation.  The episodes occur randomly, with no clear aggravating or alleviating factors.  No associated dizziness or lightheadedness.  No presyncopal or syncopal events.  She denies any significant exertional dyspnea.  No chest pain or exertional anginal symptoms.  No other specific complaints today.    Past Cardiac Testin. Last Coronary Angio: None  2. Prior Stress Testing: None  3. Last Echo: Remote, records unavailable  4. Prior Holter Monitor: None    Review of Systems:   Review of Systems   Constitutional:  Negative for activity change, appetite change, chills, diaphoresis, fatigue, fever, unexpected weight gain and unexpected weight loss.   Eyes:  Negative for blurred vision and double vision.   Respiratory:  Negative for cough, chest tightness, shortness of breath and wheezing.    Cardiovascular:  Positive for palpitations. Negative for chest pain and leg swelling.   Gastrointestinal:  Negative for abdominal pain, anal bleeding, blood in stool and GERD.   Endocrine: Negative for cold intolerance and heat intolerance.   Genitourinary:  Negative for hematuria.   Neurological:  Negative for dizziness, syncope, weakness and light-headedness.   Hematological:  Does not bruise/bleed easily.   Psychiatric/Behavioral:  Negative for depressed mood and stress. The patient is not " "nervous/anxious.      I have reviewed and/or updated the patient's past medical, past surgical, family, social history, problem list and allergies as appropriate.     Medications:     Current Outpatient Medications:     albuterol sulfate  (90 Base) MCG/ACT inhaler, INHALE 2 PUFFS BY MOUTH EVERY 4 HOURS AS NEEDED FOR COUGH, Disp: , Rfl:     Ascorbic Acid (Vitamin C) 100 MG chewable tablet, Daily., Disp: , Rfl:     budesonide-formoterol (Symbicort) 80-4.5 MCG/ACT inhaler, Inhale 2 puffs 2 (Two) Times a Day. (Patient taking differently: Inhale 2 puffs 2 (Two) Times a Day As Needed.), Disp: 6.9 g, Rfl: 1    calcium citrate-vitamin d (CALCITRATE) 315-250 MG-UNIT tablet tablet, Take 2 tablets by mouth Daily., Disp: , Rfl:     Cranberry 1000 MG capsule, Daily., Disp: , Rfl:     fexofenadine (ALLEGRA) 180 MG tablet, Take 1 tablet by mouth Daily. 1/2 diaily, Disp: , Rfl:     SUMAtriptan (IMITREX) 100 MG tablet, Take 1 tablet by mouth 1 (One) Time As Needed for Migraine. Take one tablet at onset of headache. May repeat dose one time in 2 hours if headache not relieved., Disp: 9 tablet, Rfl: 3    cyclobenzaprine (FLEXERIL) 5 MG tablet, Take 1 tablet by mouth 3 (Three) Times a Day As Needed for Muscle Spasms. (Patient not taking: Reported on 6/9/2023), Disp: 15 tablet, Rfl: 0    promethazine-dextromethorphan (PROMETHAZINE-DM) 6.25-15 MG/5ML syrup, Take 5 mL by mouth At Night As Needed for Cough. (Patient not taking: Reported on 6/9/2023), Disp: 473 mL, Rfl: 0    Physical Exam:  Vital Signs:   Vitals:    06/09/23 1028   BP: 108/60   BP Location: Left arm   Patient Position: Sitting   Cuff Size: Adult   Pulse: 76   SpO2: 97%   Weight: 84.4 kg (186 lb)   Height: 172.7 cm (68\")       Physical Exam  Constitutional:       General: She is not in acute distress.     Appearance: Normal appearance. She is well-developed. She is not diaphoretic.   HENT:      Head: Normocephalic and atraumatic.   Eyes:      General: No scleral " icterus.     Pupils: Pupils are equal, round, and reactive to light.   Neck:      Trachea: No tracheal deviation.   Cardiovascular:      Rate and Rhythm: Normal rate and regular rhythm.      Heart sounds: Normal heart sounds. No murmur heard.    No friction rub. No gallop.      Comments: Normal JVD.  Pulmonary:      Effort: Pulmonary effort is normal. No respiratory distress.      Breath sounds: Normal breath sounds. No stridor. No wheezing or rales.   Chest:      Chest wall: No tenderness.   Abdominal:      General: Bowel sounds are normal. There is no distension.      Palpations: Abdomen is soft.      Tenderness: There is no abdominal tenderness. There is no guarding or rebound.   Musculoskeletal:         General: No swelling. Normal range of motion.      Cervical back: Neck supple. No tenderness.   Lymphadenopathy:      Cervical: No cervical adenopathy.   Skin:     General: Skin is warm and dry.      Findings: No erythema.   Neurological:      General: No focal deficit present.      Mental Status: She is alert and oriented to person, place, and time.   Psychiatric:         Mood and Affect: Mood normal.         Behavior: Behavior normal.       Results Review:   I reviewed the patient's new clinical results.        Assessment / Plan:     1.  Palpitations  -- Occasional episodes of palpitations, symptoms suggestive of symptomatic ectopy  -- Last ECG without evidence of conduction system abnormalities  -- Last TSH reportedly within normal limits  -- Will proceed with outpatient cardiac monitoring to further assess for symptomatic ectopy, rule out arrhythmia  -- Follow-up in 3 months to review results of cardiac monitoring    2.  Mitral valve prolapse  -- Reported history of mild mitral valve prolapse on remote echocardiogram  -- Repeat echocardiogram to reassess    Follow Up:   Return in about 3 months (around 9/9/2023).      Thank you for allowing me to participate in the care of your patient. Please to not hesitate  to contact me with additional questions or concerns.     LOUISE Woods MD  Interventional Cardiology   06/09/2023  10:43 EDT

## 2023-08-01 ENCOUNTER — OFFICE VISIT (OUTPATIENT)
Age: 58
End: 2023-08-01
Payer: COMMERCIAL

## 2023-08-01 ENCOUNTER — TELEPHONE (OUTPATIENT)
Dept: CARDIOLOGY | Facility: CLINIC | Age: 58
End: 2023-08-01
Payer: COMMERCIAL

## 2023-08-01 VITALS
HEART RATE: 77 BPM | OXYGEN SATURATION: 97 % | TEMPERATURE: 98.9 F | BODY MASS INDEX: 28.7 KG/M2 | SYSTOLIC BLOOD PRESSURE: 130 MMHG | HEIGHT: 68 IN | WEIGHT: 189.4 LBS | DIASTOLIC BLOOD PRESSURE: 86 MMHG

## 2023-08-01 DIAGNOSIS — J01.10 ACUTE NON-RECURRENT FRONTAL SINUSITIS: Primary | ICD-10-CM

## 2023-08-01 LAB
EXPIRATION DATE: NORMAL
EXPIRATION DATE: NORMAL
FLUAV AG UPPER RESP QL IA.RAPID: NOT DETECTED
FLUBV AG UPPER RESP QL IA.RAPID: NOT DETECTED
INTERNAL CONTROL: NORMAL
INTERNAL CONTROL: NORMAL
Lab: NORMAL
Lab: NORMAL
S PYO AG THROAT QL: NEGATIVE
SARS-COV-2 AG UPPER RESP QL IA.RAPID: NOT DETECTED

## 2023-08-01 RX ORDER — AMOXICILLIN AND CLAVULANATE POTASSIUM 875; 125 MG/1; MG/1
1 TABLET, FILM COATED ORAL 2 TIMES DAILY
Qty: 14 TABLET | Refills: 0 | Status: SHIPPED | OUTPATIENT
Start: 2023-08-01

## 2023-08-29 ENCOUNTER — OFFICE VISIT (OUTPATIENT)
Dept: CARDIOLOGY | Facility: CLINIC | Age: 58
End: 2023-08-29
Payer: COMMERCIAL

## 2023-08-29 VITALS
HEART RATE: 70 BPM | WEIGHT: 189 LBS | SYSTOLIC BLOOD PRESSURE: 112 MMHG | OXYGEN SATURATION: 99 % | DIASTOLIC BLOOD PRESSURE: 72 MMHG | BODY MASS INDEX: 28.64 KG/M2 | HEIGHT: 68 IN | RESPIRATION RATE: 16 BRPM

## 2023-08-29 DIAGNOSIS — R00.2 PALPITATIONS: Primary | ICD-10-CM

## 2023-08-29 PROCEDURE — 99213 OFFICE O/P EST LOW 20 MIN: CPT | Performed by: INTERNAL MEDICINE

## 2023-08-29 NOTE — PROGRESS NOTES
[facility] Cardiology Office Follow Up Note    Agatha Carbajal  [id number]  2023    Primary Care Provider: Fawn Fischer MD    Chief Complaint: Follow-up after cardiac testing    History of Present Illness:   Mrs. Agatha Carbajal is a 57 y.o. female who presents to the Cardiology Clinic for follow-up after cardiac testing.  The patient has a past medical history significant for GERD and prior breast cancer.  She does not have any significant past cardiac history.  At the time of her last appointment, the patient reported palpitations.  She subsequently went cardiac monitoring in  with no significant arrhythmias.  She had rare ectopy, which was asymptomatic.  Today, the patient reports she is doing well from a cardiac standpoint.  Her episodes of palpitations remain rare.  No sustained episodes.  No dizziness or lightheadedness.  She denies any presyncopal or syncopal events.  No new complaints today.     Past Cardiac Testin. Last Coronary Angio: None  2. Prior Stress Testing: None  3. Last Echo:   1.  Normal left ventricular size and systolic function, LVEF 65-70%.  2.  Normal LV diastolic filling pattern.  3.  Normal right ventricular size and systolic function.  4.  Normal left atrial volume index.  5.  Trace mitral regurgitation.  4. Prior Holter Monitor: 14-day Holter    1.  No significant arrhythmias   2.  Rare ectopy   3.  No correlation of symptoms to ectopy    Review of Systems:   Review of Systems   Constitutional:  Negative for activity change, appetite change, chills, diaphoresis, fatigue, fever, unexpected weight gain and unexpected weight loss.   Eyes:  Negative for blurred vision and double vision.   Respiratory:  Negative for cough, chest tightness, shortness of breath and wheezing.    Cardiovascular:  Negative for chest pain, palpitations and leg swelling.   Gastrointestinal:  Negative for abdominal pain, anal bleeding, blood in stool and GERD.    Endocrine: Negative for cold intolerance and heat intolerance.   Genitourinary:  Negative for hematuria.   Neurological:  Negative for dizziness, syncope, weakness and light-headedness.   Hematological:  Does not bruise/bleed easily.   Psychiatric/Behavioral:  Negative for depressed mood and stress. The patient is not nervous/anxious.      I have reviewed and/or updated the patient's past medical, past surgical, family, social history, problem list and allergies as appropriate.     Medications:     Current Outpatient Medications:     Ascorbic Acid (Vitamin C) 100 MG chewable tablet, Daily., Disp: , Rfl:     calcium citrate-vitamin d (CALCITRATE) 315-250 MG-UNIT tablet tablet, Take 2 tablets by mouth Daily., Disp: , Rfl:     Cranberry 1000 MG capsule, Daily., Disp: , Rfl:     cyclobenzaprine (FLEXERIL) 5 MG tablet, Take 1 tablet by mouth 3 (Three) Times a Day As Needed for Muscle Spasms., Disp: 15 tablet, Rfl: 0    fexofenadine (ALLEGRA) 180 MG tablet, Take 1 tablet by mouth Daily. 1/2 diaily, Disp: , Rfl:     SUMAtriptan (IMITREX) 100 MG tablet, take 1 tablet by mouth at onset of headache may repeat in 2 hours if needed max of 2 tablets in 24 hours, Disp: 9 tablet, Rfl: 0    albuterol sulfate  (90 Base) MCG/ACT inhaler, INHALE 2 PUFFS BY MOUTH EVERY 4 HOURS AS NEEDED FOR COUGH, Disp: , Rfl:     amoxicillin-clavulanate (AUGMENTIN) 875-125 MG per tablet, Take 1 tablet by mouth 2 (Two) Times a Day., Disp: 14 tablet, Rfl: 0    budesonide-formoterol (Symbicort) 80-4.5 MCG/ACT inhaler, Inhale 2 puffs 2 (Two) Times a Day. (Patient taking differently: Inhale 2 puffs 2 (Two) Times a Day As Needed.), Disp: 6.9 g, Rfl: 1    promethazine-dextromethorphan (PROMETHAZINE-DM) 6.25-15 MG/5ML syrup, Take 5 mL by mouth At Night As Needed for Cough., Disp: 473 mL, Rfl: 0    Physical Exam:  Vital Signs:   Vitals:    08/29/23 1404   BP: 112/72   BP Location: Left arm   Patient Position: Sitting   Pulse: 70   Resp: 16   SpO2: 99%  "  Weight: 85.7 kg (189 lb)   Height: 172.7 cm (68\")       Physical Exam  Constitutional:       General: She is not in acute distress.     Appearance: Normal appearance. She is well-developed. She is not diaphoretic.   HENT:      Head: Normocephalic and atraumatic.   Eyes:      General: No scleral icterus.     Pupils: Pupils are equal, round, and reactive to light.   Neck:      Trachea: No tracheal deviation.   Cardiovascular:      Rate and Rhythm: Normal rate and regular rhythm.      Heart sounds: Normal heart sounds. No murmur heard.    No friction rub. No gallop.      Comments: Normal JVD.  Pulmonary:      Effort: Pulmonary effort is normal. No respiratory distress.      Breath sounds: Normal breath sounds. No stridor. No wheezing or rales.   Chest:      Chest wall: No tenderness.   Abdominal:      General: Bowel sounds are normal. There is no distension.      Palpations: Abdomen is soft.      Tenderness: There is no abdominal tenderness. There is no guarding or rebound.   Musculoskeletal:         General: No swelling. Normal range of motion.      Cervical back: Neck supple. No tenderness.   Lymphadenopathy:      Cervical: No cervical adenopathy.   Skin:     General: Skin is warm and dry.      Findings: No erythema.   Neurological:      General: No focal deficit present.      Mental Status: She is alert and oriented to person, place, and time.   Psychiatric:         Mood and Affect: Mood normal.         Behavior: Behavior normal.       Results Review:   I reviewed the patient's new clinical results.        Assessment / Plan:     1.  Palpitations  -- Outpatient cardiac monitoring showed no significant arrhythmias.  Rare ectopy with no correlation of symptoms to ectopy  --Prior ECG without significant conduction system abnormalities  --Given unremarkable cardiac monitoring, no indication for further work-up at this time  --Will follow on an as-needed basis     2.  Mitral valve prolapse  -- Reported history of mild " mitral valve prolapse on remote echocardiogram  -- Repeat echocardiogram shows no significant mitral valve prolapse    Follow Up:   Return if symptoms worsen or fail to improve.      Thank you for allowing me to participate in the care of your patient. Please to not hesitate to contact me with additional questions or concerns.     LOUISE Woods MD  Interventional Cardiology   08/29/2023  14:25 EDT

## 2023-10-09 DIAGNOSIS — G43.109 MIGRAINE WITH AURA AND WITHOUT STATUS MIGRAINOSUS, NOT INTRACTABLE: ICD-10-CM

## 2023-10-09 RX ORDER — SUMATRIPTAN 100 MG/1
TABLET, FILM COATED ORAL
Qty: 9 TABLET | Refills: 0 | Status: SHIPPED | OUTPATIENT
Start: 2023-10-09

## 2023-10-09 NOTE — TELEPHONE ENCOUNTER
Rx Refill Note  Requested Prescriptions     Pending Prescriptions Disp Refills    SUMAtriptan (IMITREX) 100 MG tablet [Pharmacy Med Name: SUMAtriptan Succinate Oral Tablet 100 MG] 9 tablet 0     Sig: take 1 tablet by mouth at onset of headache may repeat in 2 hours if needed max of 2 tablets in 24 hours      Last office visit with prescribing clinician: 2/28/2023   Last telemedicine visit with prescribing clinician: Visit date not found   Next office visit with prescribing clinician: 3/5/2024                         Would you like a call back once the refill request has been completed: [] Yes [] No    If the office needs to give you a call back, can they leave a voicemail: [] Yes [] No    Paris Pfeiffer, JORDYN  10/09/23, 11:21 EDT

## 2024-01-16 DIAGNOSIS — G43.109 MIGRAINE WITH AURA AND WITHOUT STATUS MIGRAINOSUS, NOT INTRACTABLE: ICD-10-CM

## 2024-01-17 RX ORDER — SUMATRIPTAN 100 MG/1
TABLET, FILM COATED ORAL
Qty: 9 TABLET | Refills: 0 | Status: SHIPPED | OUTPATIENT
Start: 2024-01-17

## 2024-02-02 ENCOUNTER — OFFICE VISIT (OUTPATIENT)
Dept: INTERNAL MEDICINE | Facility: CLINIC | Age: 59
End: 2024-02-02
Payer: COMMERCIAL

## 2024-02-02 VITALS
HEIGHT: 68 IN | SYSTOLIC BLOOD PRESSURE: 148 MMHG | BODY MASS INDEX: 28.04 KG/M2 | OXYGEN SATURATION: 97 % | WEIGHT: 185 LBS | HEART RATE: 96 BPM | RESPIRATION RATE: 17 BRPM | DIASTOLIC BLOOD PRESSURE: 81 MMHG | TEMPERATURE: 99.5 F

## 2024-02-02 DIAGNOSIS — J06.9 UPPER RESPIRATORY TRACT INFECTION, UNSPECIFIED TYPE: Primary | ICD-10-CM

## 2024-02-02 DIAGNOSIS — R05.1 ACUTE COUGH: ICD-10-CM

## 2024-02-02 LAB
EXPIRATION DATE: NORMAL
FLUAV AG UPPER RESP QL IA.RAPID: NOT DETECTED
FLUBV AG UPPER RESP QL IA.RAPID: NOT DETECTED
INTERNAL CONTROL: NORMAL
Lab: NORMAL
SARS-COV-2 AG UPPER RESP QL IA.RAPID: NOT DETECTED

## 2024-02-02 PROCEDURE — 99213 OFFICE O/P EST LOW 20 MIN: CPT | Performed by: INTERNAL MEDICINE

## 2024-02-02 RX ORDER — CEFDINIR 300 MG/1
CAPSULE ORAL
COMMUNITY
Start: 2024-01-30

## 2024-02-02 RX ORDER — PREDNISONE 20 MG/1
TABLET ORAL
COMMUNITY
Start: 2024-01-30

## 2024-02-02 NOTE — PROGRESS NOTES
Chief Complaint   Patient presents with    Cough     Went to [facility] (on [address]) on Sunday-COVID, FLU, RSV and strep were all negative then.    She has been battling something for the last 6 weeks, been to [facility] twice.  Had antibiotics and steroids.  But flared back up this week.  Running slight fever.       Subjective     History of Present Illness   Agatha Carbajal 58-year-old female who is here for follow-up visit with concerns for cough.    About 6 weeks ago, she felt feverish but did not have a fever.  Despite a negative strep test, she was put on Augmentin and taper pack of steroid. She took about half of the steroids and was told to stop whenever she felt better. She felt better soon, but she had a tickly cough, which is not unusual for her because she deals with allergies.     On Sunday night, 01/28/2024, she started feeling bad again and went to urgent care. She was tested for upper respiratory panel and was negative for streptococcus. She was given prednisone and cefdinir. She was told to hold the cefdinir if the prednisone worked. She was given a steroid injection that night. Steroids seemed to help at that time. She took steroids on Monday and Tuesday. She discontinued steroids after Tuesday because her fever went up to 101F and she felt terrible. She then started cefdinir but still feels bad.     She does not have blood pressure issues.      She had RSV last summer. She had COVID-19 twice.    The following portions of the patient's history were reviewed and updated as appropriate: allergies, current medications, past family history, past medical history, past social history, past surgical history and problem list.    Review of Systems   Constitutional:  Negative for chills, fatigue and fever.   HENT:  Positive for congestion. Negative for ear pain, rhinorrhea, sinus pressure and sore throat.    Eyes:  Negative for visual disturbance.   Respiratory:  Positive for cough. Negative for chest tightness, shortness of  breath and wheezing.    Cardiovascular:  Negative for chest pain, palpitations and leg swelling.   Gastrointestinal:  Negative for abdominal pain, blood in stool, constipation, diarrhea, nausea and vomiting.   Endocrine: Negative for polydipsia and polyuria.   Genitourinary:  Negative for dysuria and hematuria.   Musculoskeletal:  Negative for arthralgias and back pain.   Skin:  Negative for rash.   Neurological:  Negative for dizziness, light-headedness, numbness and headaches.   Psychiatric/Behavioral:  Negative for dysphoric mood and sleep disturbance. The patient is not nervous/anxious.        Allergies   Allergen Reactions    Hydrocortisone Other (See Comments)    Oxycodone-Acetaminophen Other (See Comments)    Bactrim [Sulfamethoxazole-Trimethoprim] Rash    Demerol [Meperidine] Nausea And Vomiting    Effexor [Venlafaxine] Other (See Comments)     Weird sensation    Oxycodone Other (See Comments)     HEADACHE         Past Medical History:   Diagnosis Date    Acid reflux     Arthritis     Bilateral ovarian cysts     Breast cancer 2009    RIGHT    Bronchitis     Kidney stone     Migraine headache     MVP (mitral valve prolapse)     Osteoporosis     Piercing     EARS ONLY    Skin cancer     BASAL CELL ON CHEST, SQUAMOUS CELL RIGHT ARM    Tinnitus     Wears glasses     TO READ       Social History     Socioeconomic History    Marital status:    Tobacco Use    Smoking status: Never     Passive exposure: Never    Smokeless tobacco: Never   Substance and Sexual Activity    Alcohol use: No    Drug use: No    Sexual activity: Defer        Past Surgical History:   Procedure Laterality Date    BREAST AUGMENTATION Bilateral 2010    BREAST BIOPSY Right     COLONOSCOPY  2016    COLONOSCOPY N/A 3/20/2023    Procedure: COLONOSCOPY with polypectomy;  Surgeon: Mick Triplett MD;  Location: [facility] ENDOSCOPY;  Service: Gastroenterology;  Laterality: N/A;    ENDOSCOPY N/A 2/15/2019    Procedure: ESOPHAGOGASTRODUODENOSCOPY  "WITH COLD FORCEP BIOPSIES;  Surgeon: Mick Triplett MD;  Location: [facility] ENDOSCOPY;  Service: Gastroenterology    HYSTERECTOMY      COMPLETE    MASTECTOMY Bilateral 2009    SKIN BIOPSY      chest, right arm       Family History   Problem Relation Age of Onset    Arthritis Mother     Migraines Mother     Prostate cancer Father     Hypertension Father     Kidney disease Father     Obesity Father     Breast cancer Sister     Heart disease Maternal Grandmother     Osteoporosis Maternal Grandmother     Heart disease Paternal Grandfather     Tuberculosis Paternal Grandfather          Current Outpatient Medications:     albuterol sulfate  (90 Base) MCG/ACT inhaler, INHALE 2 PUFFS BY MOUTH EVERY 4 HOURS AS NEEDED FOR COUGH, Disp: , Rfl:     Ascorbic Acid (Vitamin C) 100 MG chewable tablet, Daily., Disp: , Rfl:     budesonide-formoterol (Symbicort) 80-4.5 MCG/ACT inhaler, Inhale 2 puffs 2 (Two) Times a Day. (Patient taking differently: Inhale 2 puffs 2 (Two) Times a Day As Needed.), Disp: 6.9 g, Rfl: 1    calcium citrate-vitamin d (CALCITRATE) 315-250 MG-UNIT tablet tablet, Take 2 tablets by mouth Daily., Disp: , Rfl:     predniSONE (DELTASONE) 20 MG tablet, take 1 tab by mouth 3 times daily x 3 days, 1 tab twice daily x 3 days, 1 tab daily x 2 days then 1/2 tab daily x 2 days, Disp: , Rfl:     SUMAtriptan (IMITREX) 100 MG tablet, take 1 tablet by mouth at onset of headache may repeat in 2 hours if needed max of 2 tablets in 24 hours, Disp: 9 tablet, Rfl: 0    cefdinir (OMNICEF) 300 MG capsule, TAKE 1 CAPSULE BY MOUTH EVERY 12 HOURS UNTIL GONE (Patient not taking: Reported on 2/2/2024), Disp: , Rfl:     Objective   /81   Pulse 96   Temp 99.5 °F (37.5 °C)   Resp 17   Ht 172.7 cm (68\")   Wt 83.9 kg (185 lb)   SpO2 97%   BMI 28.13 kg/m²     Physical Exam  Vitals and nursing note reviewed.   Constitutional:       Appearance: Normal appearance. She is well-developed.   HENT:      Head: Normocephalic and " atraumatic.      Right Ear: A middle ear effusion is present. Tympanic membrane is bulging.   Eyes:      Extraocular Movements: Extraocular movements intact.      Conjunctiva/sclera: Conjunctivae normal.   Pulmonary:      Effort: Pulmonary effort is normal.      Breath sounds: No wheezing.   Musculoskeletal:      Cervical back: Normal range of motion and neck supple.   Skin:     General: Skin is warm and dry.      Findings: No rash.   Neurological:      General: No focal deficit present.      Mental Status: She is alert and oriented to person, place, and time.   Psychiatric:         Mood and Affect: Mood normal.         Behavior: Behavior normal.         Assessment & Plan   Diagnoses and all orders for this visit:    1. Upper respiratory tract infection, unspecified type (Primary)            Discussion Summary:  Patient is a 58 y.o. female presenting for follow up.    Upper respiratory infection with cough.  - Her influenza and COVID-19 tests were negative.   - higher chance patient has viral illness.   - advised to complete antibiotics WITH steroids.  Prednisone 40mg today and then 20mg daily x4 days.  Prior doses were too strong.   - If she is still having a fever by Monday, 02/05/2024 or Tuesday, 02/06/2024, she will inform the provider and her antibiotics will be changed.    Fluid in right ear  - Steroids will help to dry the fluid.      Follow up:  No follow-ups on file.     Transcribed from ambient dictation for Darren Snell DO by Maria Teresa Prabhakar.  02/02/24   13:57 EST    Patient or patient representative verbalized consent to the visit recording.  I have personally performed the services described in this document as transcribed by the above individual, and it is both accurate and complete.

## 2024-02-08 ENCOUNTER — TELEPHONE (OUTPATIENT)
Dept: INTERNAL MEDICINE | Facility: CLINIC | Age: 59
End: 2024-02-08
Payer: COMMERCIAL

## 2024-02-08 NOTE — TELEPHONE ENCOUNTER
Caller: Agatha Carbajal    Relationship: Self    Best call back number: [phone]     Which medication are you concerned about: CEFDINIR AND PREDNISONE    Who prescribed you this medication: DR MENDIOLA    When did you start taking this medication:     What are your concerns: AGATHA SAYS SHE STOPPED TAKING CEFDINIR AND PREDNISONE.  SHE HAD HEADACHES AND UPSET STOMACH, SOME WEAKNESS IN LEFT ARM. JUST HAPPENED ONE TIME YESTERDAY 45 MINUTES AFTER TAKING MEDS AND WAS OK ONCE MEDS WORE OFF.  BUT NOT SINCE STOPPING THE MEDS.  SHE IS OVER THE SYMPTOMS SHE BEGINS TAKING THE MEDS FOR.    How long have you had these concerns: 2 DAYS

## 2024-03-05 ENCOUNTER — OFFICE VISIT (OUTPATIENT)
Dept: INTERNAL MEDICINE | Facility: CLINIC | Age: 59
End: 2024-03-05
Payer: COMMERCIAL

## 2024-03-05 VITALS
OXYGEN SATURATION: 98 % | BODY MASS INDEX: 28.79 KG/M2 | HEART RATE: 65 BPM | DIASTOLIC BLOOD PRESSURE: 81 MMHG | SYSTOLIC BLOOD PRESSURE: 119 MMHG | TEMPERATURE: 97.5 F | WEIGHT: 190 LBS | HEIGHT: 68 IN | RESPIRATION RATE: 18 BRPM

## 2024-03-05 DIAGNOSIS — Z00.00 ROUTINE GENERAL MEDICAL EXAMINATION AT A HEALTH CARE FACILITY: Primary | ICD-10-CM

## 2024-03-05 DIAGNOSIS — G43.109 MIGRAINE WITH AURA AND WITHOUT STATUS MIGRAINOSUS, NOT INTRACTABLE: ICD-10-CM

## 2024-03-05 PROCEDURE — 99396 PREV VISIT EST AGE 40-64: CPT | Performed by: INTERNAL MEDICINE

## 2024-03-05 RX ORDER — SUMATRIPTAN 100 MG/1
TABLET, FILM COATED ORAL
Qty: 9 TABLET | Refills: 3 | Status: SHIPPED | OUTPATIENT
Start: 2024-03-05

## 2024-03-05 NOTE — PROGRESS NOTES
Subjective   Agatha Carbajal is a 58 y.o. female.     Chief Complaint   Patient presents with    Annual Exam       History of Present Illness   Patient is here for a physical and due for labs , she complains migraines and needs refills on imitrex    Review of Systems  Migraines    Past Medical History:   Diagnosis Date    Acid reflux     Arthritis     Bilateral ovarian cysts     Breast cancer 2009    RIGHT    Bronchitis     Kidney stone     Migraine headache     MVP (mitral valve prolapse)     Osteoporosis     Piercing     EARS ONLY    Skin cancer     BASAL CELL ON CHEST, SQUAMOUS CELL RIGHT ARM    Tinnitus     Wears glasses     TO READ       Past Surgical History:   Procedure Laterality Date    BREAST AUGMENTATION Bilateral 2010    BREAST BIOPSY Right     COLONOSCOPY  2016    COLONOSCOPY N/A 3/20/2023    Procedure: COLONOSCOPY with polypectomy;  Surgeon: Mick Triplett MD;  Location: [facility] ENDOSCOPY;  Service: Gastroenterology;  Laterality: N/A;    ENDOSCOPY N/A 2/15/2019    Procedure: ESOPHAGOGASTRODUODENOSCOPY WITH COLD FORCEP BIOPSIES;  Surgeon: Mick Triplett MD;  Location: [facility] ENDOSCOPY;  Service: Gastroenterology    HYSTERECTOMY      COMPLETE    MASTECTOMY Bilateral 2009    SKIN BIOPSY      chest, right arm       Family History   Problem Relation Age of Onset    Arthritis Mother     Migraines Mother     Prostate cancer Father     Hypertension Father     Kidney disease Father     Obesity Father     Breast cancer Sister     Heart disease Maternal Grandmother     Osteoporosis Maternal Grandmother     Heart disease Paternal Grandfather     Tuberculosis Paternal Grandfather         reports that she has never smoked. She has never been exposed to tobacco smoke. She has never used smokeless tobacco. She reports that she does not drink alcohol and does not use drugs.    Allergies   Allergen Reactions    Hydrocortisone Other (See Comments)    Oxycodone-Acetaminophen Other (See Comments)    Bactrim  "[Sulfamethoxazole-Trimethoprim] Rash    Demerol [Meperidine] Nausea And Vomiting    Effexor [Venlafaxine] Other (See Comments)     Weird sensation    Oxycodone Other (See Comments)     HEADACHE             Current Outpatient Medications:     albuterol sulfate  (90 Base) MCG/ACT inhaler, INHALE 2 PUFFS BY MOUTH EVERY 4 HOURS AS NEEDED FOR COUGH, Disp: , Rfl:     Ascorbic Acid (Vitamin C) 100 MG chewable tablet, Daily., Disp: , Rfl:     calcium citrate-vitamin d (CALCITRATE) 315-250 MG-UNIT tablet tablet, Take 2 tablets by mouth Daily., Disp: , Rfl:     SUMAtriptan (IMITREX) 100 MG tablet, Take one tablet at onset of headache. May repeat dose one time in 2 hours if headache not relieved., Disp: 9 tablet, Rfl: 3      Objective   Blood pressure 119/81, pulse 65, temperature 97.5 °F (36.4 °C), resp. rate 18, height 172.7 cm (67.99\"), weight 86.2 kg (190 lb), SpO2 98%, not currently breastfeeding.    Physical Exam  Vitals and nursing note reviewed.   Constitutional:       General: She is not in acute distress.     Appearance: Normal appearance. She is not diaphoretic.   HENT:      Head: Normocephalic and atraumatic.      Right Ear: External ear normal.      Left Ear: External ear normal.      Nose: Nose normal.   Eyes:      Extraocular Movements: Extraocular movements intact.      Conjunctiva/sclera: Conjunctivae normal.   Neck:      Trachea: Trachea normal.   Cardiovascular:      Rate and Rhythm: Normal rate and regular rhythm.      Heart sounds: Normal heart sounds.   Pulmonary:      Effort: Pulmonary effort is normal. No respiratory distress.      Breath sounds: Normal breath sounds.   Abdominal:      General: Abdomen is flat.   Musculoskeletal:      Cervical back: Neck supple.      Comments: Moves all limbs   Skin:     General: Skin is warm.   Neurological:      Mental Status: She is alert and oriented to person, place, and time.      Comments: No gross motor or sensory deficits         BMI is >= 25 and <30. " (Overweight) The following options were offered after discussion;: weight loss educational material (shared in after visit summary), exercise counseling/recommendations, and nutrition counseling/recommendations      Results for orders placed or performed in visit on 02/02/24   POCT SARS-CoV-2 + Flu Antigen KIMBERLEE    Specimen: Swab   Result Value Ref Range    SARS Antigen Not Detected Not Detected, Presumptive Negative    Influenza A Antigen KIMBERLEE Not Detected Not Detected    Influenza B Antigen KIMBERLEE Not Detected Not Detected    Internal Control Passed Passed    Lot Number [id number]     Expiration Date 02/05/2025          Assessment & Plan   Diagnoses and all orders for this visit:    1. Routine general medical examination at a health care facility (Primary)  -     CBC (No Diff)  -     Comprehensive Metabolic Panel  -     Lipid Panel    2. Migraine with aura and without status migrainosus, not intractable  -     SUMAtriptan (IMITREX) 100 MG tablet; Take one tablet at onset of headache. May repeat dose one time in 2 hours if headache not relieved.  Dispense: 9 tablet; Refill: 3    plan:  1.physical: Physical exam conducted today,  Will obtain fasting lab work,   Impression: healthy adult Patient. Currently, patient eats a healthy diet. Screening lab work includes glucose, lipid profile , complete blood count and comprehensive panel . The risks and benefits of immunizations were discussed and immunizations are up to date. Advice and education were given regarding nutrition and aerobic exercise. Patient discussion: discussed with the patient.  Physical with preventive exam as well as age and risk appropriate counseling completed.   Patient Discussion: plan discussed with the patient, follow-up visit needed in one year. Medication Review and medication list updated  Advised  regular weight-bearing exercise  2. Migraines : prn imitrex            Fawn Fischer MD

## 2024-03-05 NOTE — PROGRESS NOTES
Patient: Agatha Carbajal    YOB: 1965    Date: 03/06/2024    Primary Care Provider: Fawn Fischer MD    Chief Complaint   Patient presents with    Mass       SUBJECTIVE:    History of present illness:  I saw the patient in the office  today as a consultation for evaluation and treatment of a mass on the left wrist.  3 excision of the cyst in the region in the past.  Would like to have attempts at aspiration today.  Area causing pain and tingling in the left wrist and arm.    The following portions of the patient's history were reviewed and updated as appropriate: allergies, current medications, past family history, past medical history, past social history, past surgical history and problem list.    Review of Systems   Constitutional:  Negative for chills, fever and unexpected weight change.   HENT:  Negative for hearing loss, trouble swallowing and voice change.    Eyes:  Negative for visual disturbance.   Respiratory:  Negative for apnea, cough, chest tightness, shortness of breath and wheezing.    Cardiovascular:  Negative for chest pain, palpitations and leg swelling.   Gastrointestinal:  Negative for abdominal distention, abdominal pain, anal bleeding, blood in stool, constipation, diarrhea, nausea, rectal pain and vomiting.   Endocrine: Negative for cold intolerance and heat intolerance.   Genitourinary:  Negative for difficulty urinating, dysuria and flank pain.   Musculoskeletal:  Negative for back pain and gait problem.   Skin:  Negative for color change, rash and wound.   Neurological:  Negative for dizziness, syncope, speech difficulty, weakness, light-headedness, numbness and headaches.   Hematological:  Negative for adenopathy. Does not bruise/bleed easily.   Psychiatric/Behavioral:  Negative for confusion. The patient is not nervous/anxious.        History:  Past Medical History:   Diagnosis Date    Acid reflux     Arthritis     Bilateral ovarian cysts     Breast cancer 2009    RIGHT     Bronchitis     Kidney stone     Migraine headache     MVP (mitral valve prolapse)     Osteoporosis     Piercing     EARS ONLY    Skin cancer     BASAL CELL ON CHEST, SQUAMOUS CELL RIGHT ARM    Tinnitus     Wears glasses     TO READ       Past Surgical History:   Procedure Laterality Date    BREAST AUGMENTATION Bilateral 2010    BREAST BIOPSY Right     COLONOSCOPY  2016    COLONOSCOPY N/A 3/20/2023    Procedure: COLONOSCOPY with polypectomy;  Surgeon: Mick Triplett MD;  Location: [facility] ENDOSCOPY;  Service: Gastroenterology;  Laterality: N/A;    ENDOSCOPY N/A 2/15/2019    Procedure: ESOPHAGOGASTRODUODENOSCOPY WITH COLD FORCEP BIOPSIES;  Surgeon: Mick Triplett MD;  Location: [facility] ENDOSCOPY;  Service: Gastroenterology    HYSTERECTOMY      COMPLETE    MASTECTOMY Bilateral 2009    SKIN BIOPSY      chest, right arm       Family History   Problem Relation Age of Onset    Arthritis Mother     Migraines Mother     Prostate cancer Father     Hypertension Father     Kidney disease Father     Obesity Father     Breast cancer Sister     Heart disease Maternal Grandmother     Osteoporosis Maternal Grandmother     Heart disease Paternal Grandfather     Tuberculosis Paternal Grandfather        Social History     Tobacco Use    Smoking status: Never     Passive exposure: Never    Smokeless tobacco: Never   Vaping Use    Vaping status: Never Used   Substance Use Topics    Alcohol use: No    Drug use: No        Allergies:  Allergies   Allergen Reactions    Hydrocortisone Other (See Comments)    Oxycodone-Acetaminophen Other (See Comments)    Bactrim [Sulfamethoxazole-Trimethoprim] Rash    Demerol [Meperidine] Nausea And Vomiting    Effexor [Venlafaxine] Other (See Comments)     Weird sensation    Oxycodone Other (See Comments)     HEADACHE         Medications:    Current Outpatient Medications:     albuterol sulfate  (90 Base) MCG/ACT inhaler, INHALE 2 PUFFS BY MOUTH EVERY 4 HOURS AS NEEDED FOR COUGH, Disp: , Rfl:      "Ascorbic Acid (Vitamin C) 100 MG chewable tablet, Daily., Disp: , Rfl:     calcium citrate-vitamin d (CALCITRATE) 315-250 MG-UNIT tablet tablet, Take 2 tablets by mouth Daily., Disp: , Rfl:     fexofenadine (Allegra Allergy) 180 MG tablet, Take 1 tablet by mouth Daily., Disp: , Rfl:     SUMAtriptan (IMITREX) 100 MG tablet, Take one tablet at onset of headache. May repeat dose one time in 2 hours if headache not relieved., Disp: 9 tablet, Rfl: 3    OBJECTIVE:    Vital Signs:   Vitals:    03/06/24 0830   BP: 122/78   Pulse: 88   Temp: 97.7 °F (36.5 °C)   SpO2: 96%   Weight: 87.5 kg (193 lb)   Height: 172.7 cm (67.99\")       Physical Exam:   General Appearance:    Alert, cooperative, in no acute distress   Head:    Normocephalic, without obvious abnormality, atraumatic   Eyes:            Lids and lashes normal, conjunctivae and sclerae normal, no   icterus, no pallor, corneas clear.   Ears:    Ears appear intact with no abnormalities noted   Throat:   No oral lesions, no thrush, oral mucosa moist   Neck:   No adenopathy, supple, trachea midline, no thyromegaly, no   carotid bruit.   Lungs:     Clear to auscultation,respirations regular, even and                  unlabored    Heart:    Regular rhythm and normal rate, no Murmur.   Abdomen:     Normal bowel sounds, no masses, no organomegaly, soft        non-tender, non-distended , no guarding   Extremities:   Moves all extremities well, no edema, no cyanosis, no             Redness. Ganglion cyst present left wrist on top of the radial artery.  Tender to palpation.   Pulses:   Pulses palpable and equal bilaterally   Skin:   No bleeding, bruising or rash.   Lymph nodes:   No palpable adenopathy   Neurologic:   Cranial nerves 2 - 12 grossly intact, sensation intact.     Results Review:   I reviewed the patient's new clinical results.    Review of Systems was reviewed and confirmed as accurate as documented by the MA.    ASSESSMENT/PLAN:    1. Ganglion cyst "          LEFT UPPER EXTREMITY ULTRASOUND with FINE NEEDLE ASPIRATION    The patient did have a breast ultrasound performed today in the normal manner.  There was good visualization obtained of the corresponding breast as mentioned above, there was noted to be a 2 cm cystic mass of the left wrist that was successfully aspirated under direct ultrasound guidance, cells were not sent to pathology.       IMPRESSION:    Successful FNA left wrist ganglion cyst with resolution.    I did have a detailed and extensive discussion with the patient in the hospital and they understand that they need to undergo excision of Ganglion Cyst. Full risks and benefits of operative versus nonoperative intervention were discussed with the patient and these include bleeding, infection and reccurrence. The patient understands, agrees, and wishes to proceed with the surgical treatment plan as mentioned above if the cyst recurs after FNA aspiration.  Maintain compression on the wrist for 2 days.  The patient had no questions for me at the end of the discussion.       I discussed the patients findings and my recommendations with patient and consulting provider.    Electronically signed by Mick Triplett MD  03/06/24  09:21 EST

## 2024-03-06 ENCOUNTER — OFFICE VISIT (OUTPATIENT)
Dept: SURGERY | Facility: CLINIC | Age: 59
End: 2024-03-06
Payer: COMMERCIAL

## 2024-03-06 VITALS
BODY MASS INDEX: 29.25 KG/M2 | DIASTOLIC BLOOD PRESSURE: 78 MMHG | WEIGHT: 193 LBS | TEMPERATURE: 97.7 F | HEIGHT: 68 IN | SYSTOLIC BLOOD PRESSURE: 122 MMHG | OXYGEN SATURATION: 96 % | HEART RATE: 88 BPM

## 2024-03-06 DIAGNOSIS — M67.40 GANGLION CYST: Primary | ICD-10-CM

## 2024-03-06 PROCEDURE — 99213 OFFICE O/P EST LOW 20 MIN: CPT | Performed by: SURGERY

## 2024-03-06 RX ORDER — FEXOFENADINE HCL 180 MG/1
1 TABLET ORAL DAILY
COMMUNITY

## 2024-04-08 NOTE — PROGRESS NOTES
Subjective:    Chief Complaint: Physical Exam     History of Present Illness   Agatha Carbajal is a 52 y.o. female here for her yearly physical exam.  She is doing well, no new complaints today.  Current medications include Claritin and Flonase for seasonal allergies and Imitrex when necessary for migraine headaches.    She would like to have her urine checked as she had a UTI 2 weeks ago.  It is no longer symptomatic.  Treated with Bactrim.    Regarding health maintenance, last Pap smear was last year.  She had a colonoscopy at age 51, 10 year follow-up was recommended.  Last blood work was last year and unremarkable.  She had breast cancer in 2009 with a bilateral mastectomy.    History:  Past Medical History:   Diagnosis Date   • Acid reflux    • Bilateral ovarian cysts    • Breast cancer 2009   • Kidney stone    • Migraine headache      Do you take any herbs or supplements that were not prescribed by a doctor? yes takes calcium with vitamin D, vit C  Are you taking calcium supplements? Yes  Are you taking aspirin daily? N/A    No Known Allergies    Past Surgical History:   Procedure Laterality Date   • BREAST AUGMENTATION  2010   • MASTECTOMY  2009       Social History   Substance Use Topics   • Smoking status: Never Smoker   • Smokeless tobacco: Never Used   • Alcohol use No       Family History   Problem Relation Age of Onset   • Arthritis Mother    • Migraines Mother    • Prostate cancer Father    • Hypertension Father    • Kidney disease Father    • Obesity Father    • Breast cancer Sister    • Heart disease Maternal Grandmother    • Osteoporosis Maternal Grandmother    • Heart disease Paternal Grandfather    • Tuberculosis Paternal Grandfather        Review of Systems   Constitutional: Negative for appetite change, chills, fatigue, fever and unexpected weight change.   HENT: Negative for congestion, ear pain, hearing loss, nosebleeds, sinus pressure, sore throat, tinnitus and trouble swallowing.    Eyes:  The form was sent to the Physician's Nurse MSG Pool via In-Basket for review and signature.    Completed form will be emailed to [email or web address]     "Negative for pain, discharge, redness, itching and visual disturbance.   Respiratory: Negative for cough, chest tightness, shortness of breath and wheezing.    Cardiovascular: Negative for chest pain, palpitations and leg swelling.   Gastrointestinal: Negative for abdominal pain, blood in stool, constipation, diarrhea, nausea and vomiting.   Endocrine: Negative for cold intolerance, heat intolerance, polydipsia, polyphagia and polyuria.   Genitourinary: Negative for decreased urine volume, dysuria, flank pain, frequency and hematuria.   Musculoskeletal: Negative for arthralgias, back pain, gait problem, joint swelling, myalgias, neck pain and neck stiffness.   Skin: Negative for color change and rash.   Allergic/Immunologic: Negative for environmental allergies, food allergies and immunocompromised state.   Neurological: Negative for dizziness, syncope, weakness, light-headedness and headaches.   Hematological: Negative for adenopathy. Does not bruise/bleed easily.   Psychiatric/Behavioral: Negative for dysphoric mood, sleep disturbance and suicidal ideas. The patient is not nervous/anxious.      Objective:    Vitals:    06/20/18 0805   BP: 118/78   Pulse: 83   Temp: 97.4 °F (36.3 °C)   SpO2: 98%   Weight: 82.1 kg (181 lb)   Height: 172.7 cm (67.99\")     Physical Exam   Constitutional: Appears well-developed and well-nourished.   Nose: Nose normal.   Mouth/Throat: Oropharynx is clear and moist.   Eyes: EOM are normal. Pupils are equal, round, and reactive to light.   Neck: Normal range of motion. Neck supple.   Cardiovascular: Normal rate, regular rhythm and normal heart sounds.    Pulmonary/Chest: Effort normal and breath sounds normal.   Abdominal: Soft. Bowel sounds are normal.   Musculoskeletal: Normal range of motion.   Lymphadenopathy: No cervical adenopathy noted.   Neurological: Alert and oriented to person, place, and time.   Skin: Skin is warm and dry.   Psychiatric: Exhibits a normal mood and affect. "     Assessment and Plan:     Agatha was seen today for follow-up.    Diagnoses and all orders for this visit:    BMI 27.0-27.9,adult    Urinary symptom or sign  -     POCT urinalysis dipstick, automated    Migraine without status migrainosus, not intractable, unspecified migraine type  -     SUMAtriptan (IMITREX) 100 MG tablet; Take 1 tablet by mouth 1 (One) Time As Needed for Migraine for up to 2 doses.    Patient Counseling:  -Nutrition: Stressed importance of moderation in sodium/caffeine intake, saturated fat and cholesterol, caloric balance, sufficient intake of fresh fruits, vegetables, fiber, calcium, iron, and 1 g folate supplementation if of childbearing age.   -Exercise: Stressed the importance of regular exercise.  -Substance Abuse: Discussed cessation/primary prevention of tobacco (if applicable), alcohol, or other drug use (if applicable); driving or other dangerous activities under the influence; availability of treatment for abuse.   -Injury prevention: Discussed safety belts, safety helmets, smoke detector, smoking near bedding or upholstery.   -Dental health: Discussed importance of regular tooth brushing, flossing, and dental visits.  -Immunizations reviewed.  -Discussed benefits of screening colonoscopy (if applicable).  -After hours service discussed with patient.    Discussed the patient's BMI with her. The BMI is above average; BMI management plan is completed    Return in about 1 year (around 6/20/2019).    Veronica Strange PA-C  06/20/2018    Please note that portions of this note were completed with a voice recognition program. Efforts were made to edit dictation, but occasionally words are mistranscribed.

## 2024-04-18 NOTE — TELEPHONE ENCOUNTER
Caller: JEANNETTE    Relationship to patient: SELF    Best call back number: [phone]    Patient is needing: PT IS RETURNING A CALL TO [facility] TO GET HER PHONE VISIT SCHEDULED SINCE SHE HAS GOT HER CT SCHEDULED ON 2.7.23.           18-Apr-2024 11:30

## 2024-06-05 ENCOUNTER — OFFICE VISIT (OUTPATIENT)
Dept: INTERNAL MEDICINE | Facility: CLINIC | Age: 59
End: 2024-06-05
Payer: COMMERCIAL

## 2024-06-05 VITALS
RESPIRATION RATE: 16 BRPM | BODY MASS INDEX: 29.25 KG/M2 | SYSTOLIC BLOOD PRESSURE: 142 MMHG | HEIGHT: 68 IN | OXYGEN SATURATION: 98 % | WEIGHT: 193 LBS | TEMPERATURE: 98.2 F | DIASTOLIC BLOOD PRESSURE: 86 MMHG | HEART RATE: 73 BPM

## 2024-06-05 DIAGNOSIS — J40 BRONCHITIS: Primary | ICD-10-CM

## 2024-06-05 PROCEDURE — 87880 STREP A ASSAY W/OPTIC: CPT | Performed by: STUDENT IN AN ORGANIZED HEALTH CARE EDUCATION/TRAINING PROGRAM

## 2024-06-05 PROCEDURE — 99213 OFFICE O/P EST LOW 20 MIN: CPT | Performed by: STUDENT IN AN ORGANIZED HEALTH CARE EDUCATION/TRAINING PROGRAM

## 2024-06-05 PROCEDURE — 87428 SARSCOV & INF VIR A&B AG IA: CPT | Performed by: STUDENT IN AN ORGANIZED HEALTH CARE EDUCATION/TRAINING PROGRAM

## 2024-06-05 RX ORDER — METHYLPREDNISOLONE 4 MG/1
TABLET ORAL
Qty: 21 TABLET | Refills: 0 | Status: SHIPPED | OUTPATIENT
Start: 2024-06-05

## 2024-06-05 RX ORDER — AZELASTINE 1 MG/ML
2 SPRAY, METERED NASAL 2 TIMES DAILY
Qty: 30 ML | Refills: 0 | Status: SHIPPED | OUTPATIENT
Start: 2024-06-05

## 2024-06-05 RX ORDER — AMOXICILLIN AND CLAVULANATE POTASSIUM 875; 125 MG/1; MG/1
1 TABLET, FILM COATED ORAL 2 TIMES DAILY
Qty: 6 TABLET | Refills: 0 | Status: SHIPPED | OUTPATIENT
Start: 2024-06-05 | End: 2024-06-08

## 2024-06-05 NOTE — PROGRESS NOTES
"    Office Note     Name: Agatha Carbajal    : 1965     MRN: [id number]     Chief Complaint  Sore Throat (Started a couple days ago) and Cough (X3 weeks. Treated with antibiotics and steroids/)    Subjective     History of Present Illness:  Agatha Carbajal is a 58 y.o. female who presents today for cough for 3 weeks. Finished a course of amoxicillin. No shortness of breath or wheezing. Sore throat is burning. Tylenol helps, now on zyrtec or allegra daily.       Family History:   Family History   Problem Relation Age of Onset    Arthritis Mother     Migraines Mother     Prostate cancer Father     Hypertension Father     Kidney disease Father     Obesity Father     Breast cancer Sister     Heart disease Maternal Grandmother     Osteoporosis Maternal Grandmother     Heart disease Paternal Grandfather     Tuberculosis Paternal Grandfather        Social History:   Social History     Socioeconomic History    Marital status:    Tobacco Use    Smoking status: Never     Passive exposure: Never    Smokeless tobacco: Never   Vaping Use    Vaping status: Never Used   Substance and Sexual Activity    Alcohol use: No    Drug use: No    Sexual activity: Defer       Health Maintenance   Topic Date Due    HEPATITIS C SCREENING  Never done    ZOSTER VACCINE (1 of 2) Never done    LIPID PANEL  2024    TDAP/TD VACCINES (1 - Tdap) 2025 (Originally 1985)    COVID-19 Vaccine (3 - -24 season) 2025 (Originally 2023)    INFLUENZA VACCINE  2024    ANNUAL PHYSICAL  2025    BMI FOLLOWUP  2025    DXA SCAN  2025    COLORECTAL CANCER SCREENING  2033    Pneumococcal Vaccine 0-64  Aged Out    PAP SMEAR  Discontinued       Objective     Vital Signs  /86   Pulse 73   Temp 98.2 °F (36.8 °C) (Infrared)   Resp 16   Ht 172.7 cm (67.99\")   Wt 87.5 kg (193 lb)   SpO2 98%   BMI 29.35 kg/m²   Estimated body mass index is 29.35 kg/m² as calculated from the following:    Height " "as of this encounter: 172.7 cm (67.99\").    Weight as of this encounter: 87.5 kg (193 lb).        Physical Exam  Constitutional:       Appearance: Normal appearance.   HENT:      Head: Normocephalic.      Right Ear: Tympanic membrane and ear canal normal.      Left Ear: Tympanic membrane and ear canal normal.      Ears:      Comments: Mild effusion from both ears     Nose: Congestion present.      Mouth/Throat:      Pharynx: Posterior oropharyngeal erythema present. No oropharyngeal exudate.   Eyes:      Conjunctiva/sclera: Conjunctivae normal.      Pupils: Pupils are equal, round, and reactive to light.   Cardiovascular:      Rate and Rhythm: Normal rate and regular rhythm.   Pulmonary:      Effort: Pulmonary effort is normal. No respiratory distress.      Breath sounds: Normal breath sounds. No wheezing, rhonchi or rales.   Abdominal:      General: Abdomen is flat. Bowel sounds are normal.      Palpations: Abdomen is soft.      Tenderness: There is no abdominal tenderness.   Skin:     General: Skin is warm and dry.   Neurological:      Mental Status: She is alert.          Procedures     Assessment and Plan     Diagnoses and all orders for this visit:    1. Bronchitis (Primary)  Assessment & Plan:  Explained to patient that this is likely viral bronchitis however she was concerned that her Augmentin was only for 7 days and not 10 days, and compared it to her mother who had 10 days of it.  Rx sent for Augmentin for 3 days.  Medrol Dosepak as prescribed supportive treatment as prescribed.    Orders:  -     amoxicillin-clavulanate (AUGMENTIN) 875-125 MG per tablet; Take 1 tablet by mouth 2 (Two) Times a Day for 3 days.  Dispense: 6 tablet; Refill: 0  -     methylPREDNISolone (MEDROL) 4 MG dose pack; Take as directed on package instructions.  Dispense: 21 tablet; Refill: 0  -     azelastine (ASTELIN) 0.1 % nasal spray; 2 sprays into the nostril(s) as directed by provider 2 (Two) Times a Day. Use in each nostril as " directed  Dispense: 30 mL; Refill: 0  -     phenol (CHLORASEPTIC) 1.4 % liquid liquid; Apply 1 spray to the mouth or throat Every 2 (Two) Hours As Needed (sore throat).  Dispense: 118 mL; Refill: 2         Counseling was given to patient for the following topics: instructions for management, risks and benefits of treatment options, and importance of treatment compliance.    Follow Up  No follow-ups on file.    MD SUSAN Bautista [facility]  [address] [place] [id number] [phone]

## 2024-06-05 NOTE — ASSESSMENT & PLAN NOTE
Explained to patient that this is likely viral bronchitis however she was concerned that her Augmentin was only for 7 days and not 10 days, and compared it to her mother who had 10 days of it.  Rx sent for Augmentin for 3 days.  Medrol Dosepak as prescribed supportive treatment as prescribed.

## 2024-06-13 ENCOUNTER — OFFICE VISIT (OUTPATIENT)
Dept: INTERNAL MEDICINE | Facility: CLINIC | Age: 59
End: 2024-06-13
Payer: COMMERCIAL

## 2024-06-13 VITALS
BODY MASS INDEX: 28.79 KG/M2 | TEMPERATURE: 97.8 F | DIASTOLIC BLOOD PRESSURE: 87 MMHG | HEART RATE: 86 BPM | OXYGEN SATURATION: 98 % | SYSTOLIC BLOOD PRESSURE: 142 MMHG | RESPIRATION RATE: 18 BRPM | HEIGHT: 68 IN | WEIGHT: 190 LBS

## 2024-06-13 DIAGNOSIS — R05.3 CHRONIC COUGH: Primary | ICD-10-CM

## 2024-06-13 DIAGNOSIS — K21.00 GASTROESOPHAGEAL REFLUX DISEASE WITH ESOPHAGITIS WITHOUT HEMORRHAGE: ICD-10-CM

## 2024-06-13 DIAGNOSIS — E78.2 MIXED HYPERLIPIDEMIA: ICD-10-CM

## 2024-06-13 DIAGNOSIS — R00.0 SINUS TACHYCARDIA: ICD-10-CM

## 2024-06-13 DIAGNOSIS — J02.9 SORE THROAT: ICD-10-CM

## 2024-06-13 PROCEDURE — 99214 OFFICE O/P EST MOD 30 MIN: CPT | Performed by: INTERNAL MEDICINE

## 2024-06-13 RX ORDER — PANTOPRAZOLE SODIUM 40 MG/1
40 TABLET, DELAYED RELEASE ORAL DAILY
Qty: 30 TABLET | Refills: 0 | Status: SHIPPED | OUTPATIENT
Start: 2024-06-13

## 2024-06-13 NOTE — PROGRESS NOTES
"Chief Complaint  Bronchitis (Follow up, patient states she just doesn't seem to be able to get better), Sore Throat, and Rapid Heart Rate    Subjective        Agatha Carbajal presents to [facility]     She complains  of cough and uri for which she took medrol and augmentin  a few days ago , but now she has a cough during the past few days and has a sore throat , she also complains of tachycardia, she states she is stressed as her father is not well and was hospitalized  , she states she feels better walking ,she also states if she takes tylenol or steroids her symptoms go away completely but come back  , she does have a coughing fit  for a few hours and has clear sputum  ,she states it started after other family members had similar complaints , she states all these symptoms started nearly 3 weeks ago and had been tot he urgent care and got a steroid shot and oral prednisone too , she complains of reflux    Objective   Vital Signs:  /87   Pulse 86   Temp 97.8 °F (36.6 °C)   Resp 18   Ht 172.7 cm (67.99\")   Wt 86.2 kg (190 lb)   SpO2 98%   BMI 28.90 kg/m²   Estimated body mass index is 28.9 kg/m² as calculated from the following:    Height as of this encounter: 172.7 cm (67.99\").    Weight as of this encounter: 86.2 kg (190 lb).               Physical Exam  Vitals and nursing note reviewed.   Constitutional:       General: She is not in acute distress.     Appearance: Normal appearance. She is not diaphoretic.   HENT:      Head: Normocephalic and atraumatic.      Right Ear: External ear normal.      Left Ear: External ear normal.      Nose: Nose normal.   Eyes:      Extraocular Movements: Extraocular movements intact.      Conjunctiva/sclera: Conjunctivae normal.   Neck:      Trachea: Trachea normal.   Cardiovascular:      Rate and Rhythm: Normal rate and regular rhythm.      Heart sounds: Normal heart sounds.   Pulmonary:      Effort: Pulmonary effort is normal. No respiratory " distress.      Breath sounds: Normal breath sounds.   Abdominal:      General: Abdomen is flat.   Musculoskeletal:      Cervical back: Neck supple.      Comments: Moves all limbs   Skin:     General: Skin is warm.   Neurological:      Mental Status: She is alert and oriented to person, place, and time.      Comments: No gross motor or sensory deficits        Result Review :    The following data was reviewed by: Fawn Fischer MD on 06/13/2024:                     Assessment and Plan     Diagnoses and all orders for this visit:    1. Chronic cough (Primary)  -     XR Chest PA & Lateral  -     Ambulatory Referral to ENT (Otolaryngology)    2. Sore throat  -     Ambulatory Referral to ENT (Otolaryngology)    3. Sinus tachycardia  -     TSH    4. Mixed hyperlipidemia  -     CBC (No Diff)  -     Comprehensive Metabolic Panel  -     Lipid Panel    5. Gastroesophageal reflux disease with esophagitis without hemorrhage    Other orders  -     pantoprazole (PROTONIX) 40 MG EC tablet; Take 1 tablet by mouth Daily. Half hour prior  To breakfast  Dispense: 30 tablet; Refill: 0    Plan:  1.  Chronic cough: Will obtain chest x-ray and refer patient to ENT  2.  Sore throat: Refer patient to ENT  3.  Sinus tachycardia: Will obtain labs and patient advised to follow-up with cardiology  4.  Mixed hyperlipidemia: Will obtain labs, diet and exercise counseled  5.  GERD: Will give a trial of pantoprazole         Follow Up        Patient was given instructions and counseling regarding her condition or for health maintenance advice. Please see specific information pulled into the AVS if appropriate.

## 2024-06-17 ENCOUNTER — HOSPITAL ENCOUNTER (OUTPATIENT)
Dept: GENERAL RADIOLOGY | Facility: HOSPITAL | Age: 59
Discharge: HOME | End: 2024-06-17
Admitting: INTERNAL MEDICINE
Payer: COMMERCIAL

## 2024-06-17 PROCEDURE — 71046 X-RAY EXAM CHEST 2 VIEWS: CPT

## 2024-06-18 ENCOUNTER — TELEPHONE (OUTPATIENT)
Dept: INTERNAL MEDICINE | Facility: CLINIC | Age: 59
End: 2024-06-18
Payer: COMMERCIAL

## 2024-06-18 LAB
ALBUMIN SERPL-MCNC: 4.2 G/DL (ref 3.5–5.2)
ALBUMIN/GLOB SERPL: 1.8 G/DL
ALP SERPL-CCNC: 94 U/L (ref 39–117)
ALT SERPL-CCNC: 10 U/L (ref 1–33)
AST SERPL-CCNC: 15 U/L (ref 1–32)
BILIRUB SERPL-MCNC: 0.5 MG/DL (ref 0–1.2)
BUN SERPL-MCNC: 11 MG/DL (ref 6–20)
BUN/CREAT SERPL: 16.2 (ref 7–25)
CALCIUM SERPL-MCNC: 8.9 MG/DL (ref 8.6–10.5)
CHLORIDE SERPL-SCNC: 105 MMOL/L (ref 98–107)
CHOLEST SERPL-MCNC: 208 MG/DL (ref 0–200)
CO2 SERPL-SCNC: 26.4 MMOL/L (ref 22–29)
CREAT SERPL-MCNC: 0.68 MG/DL (ref 0.57–1)
EGFRCR SERPLBLD CKD-EPI 2021: 101.7 ML/MIN/1.73
ERYTHROCYTE [DISTWIDTH] IN BLOOD BY AUTOMATED COUNT: 13.6 % (ref 12.3–15.4)
GLOBULIN SER CALC-MCNC: 2.3 GM/DL
GLUCOSE SERPL-MCNC: 92 MG/DL (ref 65–99)
HCT VFR BLD AUTO: 40.5 % (ref 34–46.6)
HDLC SERPL-MCNC: 65 MG/DL (ref 40–60)
HGB BLD-MCNC: 13.2 G/DL (ref 12–15.9)
LDLC SERPL CALC-MCNC: 120 MG/DL (ref 0–100)
MCH RBC QN AUTO: 28.6 PG (ref 26.6–33)
MCHC RBC AUTO-ENTMCNC: 32.6 G/DL (ref 31.5–35.7)
MCV RBC AUTO: 87.7 FL (ref 79–97)
PLATELET # BLD AUTO: 271 10*3/MM3 (ref 140–450)
POTASSIUM SERPL-SCNC: 4.1 MMOL/L (ref 3.5–5.2)
PROT SERPL-MCNC: 6.5 G/DL (ref 6–8.5)
RBC # BLD AUTO: 4.62 10*6/MM3 (ref 3.77–5.28)
SODIUM SERPL-SCNC: 139 MMOL/L (ref 136–145)
TRIGL SERPL-MCNC: 129 MG/DL (ref 0–150)
TSH SERPL DL<=0.005 MIU/L-ACNC: 0.71 UIU/ML (ref 0.27–4.2)
VLDLC SERPL CALC-MCNC: 23 MG/DL (ref 5–40)
WBC # BLD AUTO: 5.77 10*3/MM3 (ref 3.4–10.8)

## 2024-06-18 NOTE — TELEPHONE ENCOUNTER
Patient notified xray report has not been completed. We will call once done. Labs to be reviewed then we will call with results.

## 2024-06-18 NOTE — TELEPHONE ENCOUNTER
Caller: Agatha Carbajal    Relationship: Self    Best call back number: [phone]    What test was performed: X-RAY    When was the test performed: 6/18/24    Where was the test performed:     DOWNSTAIRS    Additional notes:     DOES NOT SEE RESULTS ON [email or web address].  PLEASE CALL TO DISCUSS

## 2024-08-23 ENCOUNTER — TELEMEDICINE (OUTPATIENT)
Dept: INTERNAL MEDICINE | Facility: CLINIC | Age: 59
End: 2024-08-23
Payer: COMMERCIAL

## 2024-08-23 VITALS — BODY MASS INDEX: 27.28 KG/M2 | HEIGHT: 68 IN | TEMPERATURE: 97.8 F | WEIGHT: 180 LBS

## 2024-08-23 DIAGNOSIS — J20.9 ACUTE BRONCHITIS, UNSPECIFIED ORGANISM: Primary | ICD-10-CM

## 2024-08-23 PROCEDURE — 99213 OFFICE O/P EST LOW 20 MIN: CPT | Performed by: INTERNAL MEDICINE

## 2024-08-23 RX ORDER — AMOXICILLIN AND CLAVULANATE POTASSIUM 500; 125 MG/1; MG/1
1 TABLET, FILM COATED ORAL 3 TIMES DAILY
Qty: 15 TABLET | Refills: 0 | Status: SHIPPED | OUTPATIENT
Start: 2024-08-23

## 2024-08-23 NOTE — PROGRESS NOTES
"Subjective  Agatha Carbajal is a 58 y.o. female    HPI video visit requested by patient who is at home in [place] she tested positive for COVID and now has some complaints of congestion and a cough.  Has some thick sputum production.  She was seen at urgent care and prescribed steroids.  Currently denies any shortness of breath or fever non-smoker    The following portions of the patient's history were reviewed and updated as appropriate: allergies, current medications, past family history, past medical history, past social history, past surgical history, and problem list.     Review of Systems   Constitutional:  Positive for fatigue. Negative for activity change, appetite change and fever.   HENT:  Negative for congestion, ear discharge, ear pain and trouble swallowing.    Eyes:  Negative for photophobia and visual disturbance.   Respiratory:  Positive for cough and shortness of breath.    Cardiovascular:  Negative for chest pain and palpitations.   Gastrointestinal:  Negative for abdominal distention, abdominal pain, constipation, diarrhea, nausea and vomiting.   Endocrine: Negative.    Genitourinary:  Negative for dysuria, hematuria and urgency.   Musculoskeletal:  Positive for arthralgias. Negative for back pain, joint swelling and myalgias.   Skin:  Negative for color change and rash.   Allergic/Immunologic: Negative.    Neurological:  Negative for dizziness, weakness, light-headedness and headaches.   Hematological:  Negative for adenopathy. Does not bruise/bleed easily.   Psychiatric/Behavioral:  Negative for agitation, confusion and dysphoric mood. The patient is not nervous/anxious.        Visit Vitals  Temp 97.8 °F (36.6 °C)   Ht 172.7 cm (68\")   Wt 81.6 kg (180 lb)   BMI 27.37 kg/m²       Objective  Physical Exam   Constitutional: She appears well-nourished. No distress.   Pulmonary/Chest: Effort normal.   Vitals reviewed.       Diagnoses and all orders for this visit:    Acute bronchitis, unspecified " organism suspect secondary bacterial infection empiric antibiotic therapy with Augmentin advised warm water with salt gargles Tylenol as needed

## 2024-09-30 ENCOUNTER — OFFICE VISIT (OUTPATIENT)
Dept: INTERNAL MEDICINE | Facility: CLINIC | Age: 59
End: 2024-09-30
Payer: COMMERCIAL

## 2024-09-30 VITALS
WEIGHT: 180 LBS | OXYGEN SATURATION: 98 % | HEART RATE: 76 BPM | TEMPERATURE: 98 F | HEIGHT: 68 IN | BODY MASS INDEX: 27.28 KG/M2 | DIASTOLIC BLOOD PRESSURE: 80 MMHG | SYSTOLIC BLOOD PRESSURE: 120 MMHG

## 2024-09-30 DIAGNOSIS — G43.909 MIGRAINE WITHOUT STATUS MIGRAINOSUS, NOT INTRACTABLE, UNSPECIFIED MIGRAINE TYPE: ICD-10-CM

## 2024-09-30 DIAGNOSIS — J01.00 ACUTE NON-RECURRENT MAXILLARY SINUSITIS: Primary | ICD-10-CM

## 2024-09-30 PROCEDURE — 99214 OFFICE O/P EST MOD 30 MIN: CPT | Performed by: FAMILY MEDICINE

## 2024-09-30 RX ORDER — MONTELUKAST SODIUM 10 MG/1
10 TABLET ORAL NIGHTLY
Qty: 30 TABLET | Refills: 1 | Status: SHIPPED | OUTPATIENT
Start: 2024-09-30

## 2024-09-30 RX ORDER — CETIRIZINE HYDROCHLORIDE 10 MG/1
10 TABLET ORAL DAILY
COMMUNITY

## 2024-10-03 ENCOUNTER — PRIOR AUTHORIZATION (OUTPATIENT)
Dept: INTERNAL MEDICINE | Facility: CLINIC | Age: 59
End: 2024-10-03
Payer: COMMERCIAL

## 2024-10-03 NOTE — TELEPHONE ENCOUNTER
Agatha Carbajal (Key: [id number]) - [id number]  Nurtec 75MG dispersible tablets    Outcome  Approved today by [employer] 2017  CaseId:[id number];Status:Approved;Review Type:Prior Auth;Coverage Start Date:09/03/2024;Coverage End Date:10/03/2025;

## 2024-10-14 NOTE — PROGRESS NOTES
Agatha Carbajal is a 59 y.o. female.    Chief Complaint   Patient presents with    Cough     Post COVID cough, had COVID on 08/17/2024. Has been coughing since. Feels like a junkie kind of cough, like drainage coming from back of throat.        HPI   History of Present Illness  The patient presents today with a persistent cough.    She has been experiencing an intermittent cough since 08/17/2023, which she finds bothersome. Her cough began a week after shelley COVID-19, which was accompanied by a low-grade fever. She consulted Dr. Varner via telehealth on 08/23/2023. Prior to this, she had visited urgent care where she was prescribed steroids for three days. The cough worsened, prompting another visit to urgent care where she was given steroids that seemed to alleviate the symptoms. However, she was unable to complete the full course of steroids. She then developed a sinus infection and was prescribed antibiotics by Dr. Varner, which improved her condition. Despite this, her cough returned, coinciding with a change in weather.     She is currently taking Zyrtec and occasionally uses nasal sprays, but not regularly due to discomfort in her nasal passages. She has not been using saline rinses or Flonase. She reports no chest tightness, shortness of breath, or difficulty breathing. She describes a sensation of needing to clear her throat frequently, especially when talking for extended periods, such as during meetings. Her cough does not disturb her sleep, but she experiences 8 to 10 coughing spells lasting 5 to 10 minutes each day.    She also reports constant ringing in her ears, for which she has seen an ENT specialist who found no abnormalities. She has been experiencing more frequent migraines over the past two weeks, which she manages with half a dose of Imitrex. She also reports a sensation of pressure in her nose, but no nasal discharge or runny nose. She has been taking Zyrtec for several years without any  adverse effects. She believes her cough may have been triggered by an upper respiratory infection and subsequently worsened after shelley COVID-19 from her .    She has noticed that Imitrex makes her drowsy and is considering switching to Nurtec. She tried naratriptan 8 to 10 years ago, but it caused dizziness and impaired her ability to walk. She has been taking Imitrex for years without any previous side effects.    The following portions of the patient's history were reviewed and updated as appropriate: allergies, current medications, past family history, past medical history, past social history, past surgical history and problem list.     Allergies   Allergen Reactions    Hydrocortisone Other (See Comments)    Oxycodone-Acetaminophen Other (See Comments)    Bactrim [Sulfamethoxazole-Trimethoprim] Rash    Cefdinir Rash    Demerol [Meperidine] Nausea And Vomiting    Effexor [Venlafaxine] Other (See Comments)     Weird sensation    Oxycodone Other (See Comments)     HEADACHE           Current Outpatient Medications:     albuterol sulfate  (90 Base) MCG/ACT inhaler, INHALE 2 PUFFS BY MOUTH EVERY 4 HOURS AS NEEDED FOR COUGH, Disp: , Rfl:     Ascorbic Acid (Vitamin C) 100 MG chewable tablet, Daily., Disp: , Rfl:     azelastine (ASTELIN) 0.1 % nasal spray, 2 sprays into the nostril(s) as directed by provider 2 (Two) Times a Day. Use in each nostril as directed, Disp: 30 mL, Rfl: 0    calcium citrate-vitamin d (CALCITRATE) 315-250 MG-UNIT tablet tablet, Take 2 tablets by mouth Daily., Disp: , Rfl:     pantoprazole (PROTONIX) 40 MG EC tablet, Take 1 tablet by mouth Daily. Half hour prior  To breakfast, Disp: 30 tablet, Rfl: 0    amoxicillin-clavulanate (AUGMENTIN) 875-125 MG per tablet, Take 1 tablet by mouth 2 (Two) Times a Day., Disp: 20 tablet, Rfl: 0    cetirizine (zyrTEC) 10 MG tablet, Take 1 tablet by mouth Daily., Disp: , Rfl:     montelukast (Singulair) 10 MG tablet, Take 1 tablet by mouth  "Every Night., Disp: 30 tablet, Rfl: 1    Rimegepant Sulfate (NURTEC) 75 MG tablet dispersible tablet, Take 1 tablet by mouth 1 (One) Time As Needed (migraine) for up to 1 dose., Disp: 10 tablet, Rfl: 0    ROS    Review of Systems   Constitutional:  Negative for chills and fever.   HENT:  Positive for congestion and sinus pressure.    Respiratory:  Positive for cough.    Neurological:  Positive for headache.       Vitals:    09/30/24 1422   BP: 120/80   BP Location: Left arm   Patient Position: Sitting   Cuff Size: Adult   Pulse: 76   Temp: 98 °F (36.7 °C)   SpO2: 98%   Weight: 81.6 kg (180 lb)   Height: 172.7 cm (68\")   PainSc:   2         Physical Exam     Physical Exam  Constitutional:       General: She is not in acute distress.     Appearance: Normal appearance. She is well-developed.   HENT:      Head: Normocephalic and atraumatic.      Right Ear: External ear normal. A middle ear effusion is present.      Left Ear: External ear normal. A middle ear effusion is present.      Nose:      Right Sinus: Maxillary sinus tenderness present.      Left Sinus: Maxillary sinus tenderness present.      Mouth/Throat:      Pharynx: Posterior oropharyngeal erythema (trace PND) present.   Eyes:      Extraocular Movements: Extraocular movements intact.      Conjunctiva/sclera: Conjunctivae normal.   Cardiovascular:      Rate and Rhythm: Normal rate and regular rhythm.      Heart sounds: No murmur heard.  Pulmonary:      Effort: Pulmonary effort is normal. No respiratory distress.      Breath sounds: Normal breath sounds. No wheezing.   Abdominal:      General: Bowel sounds are normal. There is no distension.      Palpations: Abdomen is soft.      Tenderness: There is no abdominal tenderness.   Skin:     General: Skin is warm and dry.   Neurological:      Mental Status: She is alert and oriented to person, place, and time.      Cranial Nerves: No cranial nerve deficit.   Psychiatric:         Mood and Affect: Mood normal.         " Behavior: Behavior normal.             Diagnoses and all orders for this visit:    1. Acute non-recurrent maxillary sinusitis (Primary)    2. Migraine without status migrainosus, not intractable, unspecified migraine type    Other orders  -     amoxicillin-clavulanate (AUGMENTIN) 875-125 MG per tablet; Take 1 tablet by mouth 2 (Two) Times a Day.  Dispense: 20 tablet; Refill: 0  -     montelukast (Singulair) 10 MG tablet; Take 1 tablet by mouth Every Night.  Dispense: 30 tablet; Refill: 1  -     Rimegepant Sulfate (NURTEC) 75 MG tablet dispersible tablet; Take 1 tablet by mouth 1 (One) Time As Needed (migraine) for up to 1 dose.  Dispense: 10 tablet; Refill: 0        Assessment & Plan  1. Maxillary sinusitis.  Augmentin will be taken twice a day for 10 days. Singulair will be added to help with drainage and potential allergy components. She may continue Zyrtec or switch to Xyzal.    2. Migraine.  Previous treatments with Imitrex and Amerge were unsuccessful due to side effects, including extreme drowsiness. Nurtec will be prescribed as an alternative. Prior authorization will be required.        New Medications Ordered This Visit   Medications    amoxicillin-clavulanate (AUGMENTIN) 875-125 MG per tablet     Sig: Take 1 tablet by mouth 2 (Two) Times a Day.     Dispense:  20 tablet     Refill:  0    montelukast (Singulair) 10 MG tablet     Sig: Take 1 tablet by mouth Every Night.     Dispense:  30 tablet     Refill:  1    Rimegepant Sulfate (NURTEC) 75 MG tablet dispersible tablet     Sig: Take 1 tablet by mouth 1 (One) Time As Needed (migraine) for up to 1 dose.     Dispense:  10 tablet     Refill:  0       No orders of the defined types were placed in this encounter.      Return if symptoms worsen or fail to improve.    Sushila Stone, DO

## 2024-11-15 ENCOUNTER — OFFICE VISIT (OUTPATIENT)
Dept: INTERNAL MEDICINE | Facility: CLINIC | Age: 59
End: 2024-11-15
Payer: COMMERCIAL

## 2024-11-15 VITALS
SYSTOLIC BLOOD PRESSURE: 122 MMHG | BODY MASS INDEX: 29.25 KG/M2 | DIASTOLIC BLOOD PRESSURE: 82 MMHG | WEIGHT: 193 LBS | HEART RATE: 78 BPM | OXYGEN SATURATION: 100 % | TEMPERATURE: 98 F | HEIGHT: 68 IN

## 2024-11-15 DIAGNOSIS — R05.3 CHRONIC COUGH: Primary | ICD-10-CM

## 2024-11-15 PROCEDURE — 87428 SARSCOV & INF VIR A&B AG IA: CPT | Performed by: FAMILY MEDICINE

## 2024-11-15 PROCEDURE — 99213 OFFICE O/P EST LOW 20 MIN: CPT | Performed by: FAMILY MEDICINE

## 2024-11-15 RX ORDER — MONTELUKAST SODIUM 10 MG/1
10 TABLET ORAL NIGHTLY
Qty: 30 TABLET | Refills: 1 | Status: SHIPPED | OUTPATIENT
Start: 2024-11-15

## 2024-11-15 NOTE — PROGRESS NOTES
Agatha Carbajal is a 59 y.o. female.    Chief Complaint   Patient presents with    Cough     Lingering post COVID cough. Has still been present since 09/30/2024. Wondering if it could be related to reflux       HPI   History of Present Illness  The patient presents today with a chronic cough.    She reports a persistent, intermittent cough that does not disrupt her sleep. The cough is accompanied by foamy expectorated material and periods of relief lasting several hours. She was previously prescribed Protonix for a similar cough in 06/2024 but did not take it as the cough improved. However, the cough returned after she contracted COVID-19 in 08/2024.    She also experienced a persistent sore throat, which resolved after a week of saltwater gargles and led to a referral to an ENT specialist. Despite three rounds of antibiotics and three or four steroid injections, the cough has not completely subsided.    She is currently taking Zyrtec for allergies and uses a nasal spray intermittently, which does not seem to affect the cough. She has not tried Singulair as previously prescribed. Her oxygen levels are normal, and she does not experience any breathing difficulties. She reports no abdominal pain or acid reflux symptoms. She has a history of a mild hiatal hernia.    She used to take Claritin-D but stopped due to an increase in heart rate. She has a humidifier at home, but it is not currently in use. She has used nasal saline in the past but not recently.    The following portions of the patient's history were reviewed and updated as appropriate: allergies, current medications, past family history, past medical history, past social history, past surgical history and problem list.     Allergies   Allergen Reactions    Hydrocortisone Other (See Comments)    Oxycodone-Acetaminophen Other (See Comments)    Bactrim [Sulfamethoxazole-Trimethoprim] Rash    Cefdinir Rash    Demerol [Meperidine] Nausea And Vomiting    Effexor  "[Venlafaxine] Other (See Comments)     Weird sensation    Oxycodone Other (See Comments)     HEADACHE           Current Outpatient Medications:     albuterol sulfate  (90 Base) MCG/ACT inhaler, INHALE 2 PUFFS BY MOUTH EVERY 4 HOURS AS NEEDED FOR COUGH, Disp: , Rfl:     Ascorbic Acid (Vitamin C) 100 MG chewable tablet, Daily., Disp: , Rfl:     azelastine (ASTELIN) 0.1 % nasal spray, 2 sprays into the nostril(s) as directed by provider 2 (Two) Times a Day. Use in each nostril as directed, Disp: 30 mL, Rfl: 0    calcium citrate-vitamin d (CALCITRATE) 315-250 MG-UNIT tablet tablet, Take 2 tablets by mouth Daily., Disp: , Rfl:     cetirizine (zyrTEC) 10 MG tablet, Take 1 tablet by mouth Daily., Disp: , Rfl:     montelukast (Singulair) 10 MG tablet, Take 1 tablet by mouth Every Night., Disp: 30 tablet, Rfl: 1    pantoprazole (PROTONIX) 40 MG EC tablet, Take 1 tablet by mouth Daily. Half hour prior  To breakfast, Disp: 30 tablet, Rfl: 0    Rimegepant Sulfate (NURTEC) 75 MG tablet dispersible tablet, Take 1 tablet by mouth 1 (One) Time As Needed (migraine) for up to 1 dose., Disp: 10 tablet, Rfl: 0    ROS    Review of Systems   Constitutional:  Negative for chills and fever.   HENT:  Negative for congestion.         Ear fullness   Respiratory:  Positive for cough. Negative for shortness of breath and wheezing.        Vitals:    11/15/24 1128   BP: 122/82   Pulse: 78   Temp: 98 °F (36.7 °C)   SpO2: 100%   Weight: 87.5 kg (193 lb)   Height: 172.7 cm (68\")   PainSc: 0-No pain         Physical Exam     Physical Exam  Constitutional:       General: She is not in acute distress.     Appearance: Normal appearance. She is well-developed.   HENT:      Head: Normocephalic and atraumatic.      Right Ear: External ear normal. A middle ear effusion is present.      Left Ear: External ear normal. A middle ear effusion is present.      Mouth/Throat:      Pharynx: Posterior oropharyngeal erythema (significant PND) present.   Eyes: "      Extraocular Movements: Extraocular movements intact.      Conjunctiva/sclera: Conjunctivae normal.   Cardiovascular:      Rate and Rhythm: Normal rate and regular rhythm.      Heart sounds: No murmur heard.  Pulmonary:      Effort: Pulmonary effort is normal. No respiratory distress.      Breath sounds: Normal breath sounds. No wheezing.   Abdominal:      General: Bowel sounds are normal. There is no distension.      Palpations: Abdomen is soft.      Tenderness: There is no abdominal tenderness.   Musculoskeletal:      Right lower leg: No edema.      Left lower leg: No edema.   Skin:     General: Skin is warm and dry.   Neurological:      Mental Status: She is alert and oriented to person, place, and time.      Cranial Nerves: No cranial nerve deficit.   Psychiatric:         Mood and Affect: Mood normal.         Behavior: Behavior normal.             Diagnoses and all orders for this visit:    1. Chronic cough (Primary)  -     POCT SARS-CoV-2 Antigen KIMBERLEE + Flu  -     Ambulatory Referral to Pulmonology    Other orders  -     montelukast (Singulair) 10 MG tablet; Take 1 tablet by mouth Every Night.  Dispense: 30 tablet; Refill: 1        Assessment & Plan  1. Persistent cough.  This is likely secondary to uncontrolled allergic rhinitis. She has been noncompliant with Singulair that was previously prescribed. She was also advised at her last office visit to change from Zyrtec to Xyzal, but continues to take Zyrtec. She is advised to add in Singulair again. She may add in Flonase, use a cool mist humidifier, and may use nasal saline as well to keep the nasal passages moistened. If this does not improve her chronic cough, a referral has been placed to pulmonology for further evaluation.        New Medications Ordered This Visit   Medications    montelukast (Singulair) 10 MG tablet     Sig: Take 1 tablet by mouth Every Night.     Dispense:  30 tablet     Refill:  1       No orders of the defined types were placed in  this encounter.      No follow-ups on file.    Sushila Stone, DO

## 2024-12-10 ENCOUNTER — OFFICE VISIT (OUTPATIENT)
Dept: INTERNAL MEDICINE | Facility: CLINIC | Age: 59
End: 2024-12-10
Payer: COMMERCIAL

## 2024-12-10 VITALS
WEIGHT: 192 LBS | OXYGEN SATURATION: 97 % | RESPIRATION RATE: 20 BRPM | HEART RATE: 76 BPM | HEIGHT: 68 IN | SYSTOLIC BLOOD PRESSURE: 128 MMHG | DIASTOLIC BLOOD PRESSURE: 76 MMHG | BODY MASS INDEX: 29.1 KG/M2 | TEMPERATURE: 98.3 F

## 2024-12-10 DIAGNOSIS — R05.8 ALLERGIC COUGH: Primary | ICD-10-CM

## 2024-12-10 DIAGNOSIS — R09.82 POSTNASAL DRIP: ICD-10-CM

## 2024-12-10 PROCEDURE — 99213 OFFICE O/P EST LOW 20 MIN: CPT | Performed by: PHYSICIAN ASSISTANT

## 2024-12-10 PROCEDURE — 87428 SARSCOV & INF VIR A&B AG IA: CPT | Performed by: PHYSICIAN ASSISTANT

## 2024-12-10 RX ORDER — AZELASTINE 1 MG/ML
2 SPRAY, METERED NASAL 2 TIMES DAILY
Qty: 30 ML | Refills: 2 | Status: SHIPPED | OUTPATIENT
Start: 2024-12-10

## 2024-12-10 NOTE — PROGRESS NOTES
Office Visit      Patient Name: Agatha Carbajal  : 1965   MRN: [id number]     Chief Complaint:    Chief Complaint   Patient presents with    Cough     Has had it since August when she had covid but has gotten worse the past few days        History of Present Illness: Agatha Carbajal is a 59 y.o. female who is here today to discuss cough. She had Covid in August and has residual cough since then.  Cough seems to has worsened in the last couple of days and her throat has been a little sore. Cough is productive for clear sputum only. No fever or SOA. On 11/15 she was started on Singulair and Zyrtec was changed to Xyzal. She used her albuterol inhaler which helps a little. She is using Flonase every other day or so. She has had steroids and antibiotics multiple times since August.     She has had similar issues in the past and took Claritin-D daily but it was eventually stopped due to causing increased heart rate. She did take it once last week and it did help.         Subjective      I have reviewed and the following portions of the patient's history were updated as appropriate: past family history, past medical history, past social history, past surgical history and problem list.      Current Outpatient Medications:     albuterol sulfate  (90 Base) MCG/ACT inhaler, INHALE 2 PUFFS BY MOUTH EVERY 4 HOURS AS NEEDED FOR COUGH, Disp: , Rfl:     Ascorbic Acid (Vitamin C) 100 MG chewable tablet, Daily., Disp: , Rfl:     azelastine (ASTELIN) 0.1 % nasal spray, Administer 2 sprays into the nostril(s) as directed by provider 2 (Two) Times a Day. Use in each nostril as directed, Disp: 30 mL, Rfl: 2    calcium citrate-vitamin d (CALCITRATE) 315-250 MG-UNIT tablet tablet, Take 2 tablets by mouth Daily., Disp: , Rfl:     cetirizine (zyrTEC) 10 MG tablet, Take 1 tablet by mouth Daily., Disp: , Rfl:     montelukast (Singulair) 10 MG tablet, Take 1 tablet by mouth Every Night., Disp: 30 tablet, Rfl: 1    pantoprazole  "(PROTONIX) 40 MG EC tablet, Take 1 tablet by mouth Daily. Half hour prior  To breakfast, Disp: 30 tablet, Rfl: 0    Rimegepant Sulfate (NURTEC) 75 MG tablet dispersible tablet, Take 1 tablet by mouth 1 (One) Time As Needed (migraine) for up to 1 dose., Disp: 10 tablet, Rfl: 0    Allergies   Allergen Reactions    Hydrocortisone Other (See Comments)    Oxycodone-Acetaminophen Other (See Comments)    Bactrim [Sulfamethoxazole-Trimethoprim] Rash    Cefdinir Rash    Demerol [Meperidine] Nausea And Vomiting    Effexor [Venlafaxine] Other (See Comments)     Weird sensation    Oxycodone Other (See Comments)     HEADACHE         Objective     Vital Signs:   Vitals:    12/10/24 0908   BP: 128/76   Pulse: 76   Resp: 20   Temp: 98.3 °F (36.8 °C)   SpO2: 97%   Weight: 87.1 kg (192 lb)   Height: 172.7 cm (68\")     Body mass index is 29.19 kg/m².    Physical Exam  Vitals and nursing note reviewed.   Constitutional:       General: She is not in acute distress.     Appearance: She is well-developed. She is not ill-appearing, toxic-appearing or diaphoretic.   HENT:      Head: Normocephalic and atraumatic.      Right Ear: Hearing, ear canal and external ear normal. A middle ear effusion (minimal) is present. There is no impacted cerumen. Tympanic membrane is not erythematous or retracted.      Left Ear: Hearing, ear canal and external ear normal. A middle ear effusion (minimal) is present. There is no impacted cerumen. Tympanic membrane is not erythematous or retracted.      Nose: Mucosal edema (with erythema) present. No nasal tenderness, congestion or rhinorrhea.      Right Nostril: No epistaxis, septal hematoma or occlusion.      Left Nostril: No epistaxis, septal hematoma or occlusion.      Right Turbinates: Enlarged and swollen. Not pale.      Left Turbinates: Enlarged and swollen. Not pale.      Right Sinus: No maxillary sinus tenderness or frontal sinus tenderness.      Left Sinus: No maxillary sinus tenderness or frontal sinus " tenderness.      Mouth/Throat:      Mouth: Mucous membranes are moist.      Pharynx: No oropharyngeal exudate or posterior oropharyngeal erythema.      Comments: Mild postnasal drip present.  Eyes:      General: No scleral icterus.     Extraocular Movements: Extraocular movements intact.      Conjunctiva/sclera: Conjunctivae normal.      Pupils: Pupils are equal, round, and reactive to light.   Cardiovascular:      Rate and Rhythm: Normal rate and regular rhythm.      Heart sounds: Normal heart sounds. No murmur heard.     No friction rub. No gallop.   Pulmonary:      Effort: Pulmonary effort is normal. No respiratory distress.      Breath sounds: Normal breath sounds. No stridor. No wheezing, rhonchi or rales.   Chest:      Chest wall: No tenderness.   Musculoskeletal:         General: No tenderness or deformity. Normal range of motion.      Cervical back: Normal range of motion and neck supple. No rigidity or tenderness.      Right lower leg: No edema.      Left lower leg: No edema.   Lymphadenopathy:      Cervical: No cervical adenopathy.   Skin:     General: Skin is warm and dry.      Capillary Refill: Capillary refill takes less than 2 seconds.      Coloration: Skin is not jaundiced or pale.      Findings: No rash.   Neurological:      Mental Status: She is alert and oriented to person, place, and time.      Cranial Nerves: No cranial nerve deficit.      Sensory: No sensory deficit.      Motor: No abnormal muscle tone.      Coordination: Coordination normal.      Gait: Gait normal.      Deep Tendon Reflexes: Reflexes normal.   Psychiatric:         Mood and Affect: Mood normal.         Behavior: Behavior normal.         Thought Content: Thought content normal.         Judgment: Judgment normal.         Common labs          6/17/2024    08:16   Common Labs   Glucose 92    BUN 11    Creatinine 0.68    Sodium 139    Potassium 4.1    Chloride 105    Calcium 8.9    Total Protein 6.5    Albumin 4.2    Total Bilirubin  0.5    Alkaline Phosphatase 94    AST (SGOT) 15    ALT (SGPT) 10    WBC 5.77    Hemoglobin 13.2    Hematocrit 40.5    Platelets 271    Total Cholesterol 208    Triglycerides 129    HDL Cholesterol 65    LDL Cholesterol  120          Assessment / Plan      Assessment/Plan:   Diagnoses and all orders for this visit:    1. Allergic cough (Primary)  -     POCT SARS-CoV-2 + Flu Antigen KIMBERLEE  -     azelastine (ASTELIN) 0.1 % nasal spray; Administer 2 sprays into the nostril(s) as directed by provider 2 (Two) Times a Day. Use in each nostril as directed  Dispense: 30 mL; Refill: 2    2. Postnasal drip  -     azelastine (ASTELIN) 0.1 % nasal spray; Administer 2 sprays into the nostril(s) as directed by provider 2 (Two) Times a Day. Use in each nostril as directed  Dispense: 30 mL; Refill: 2       Continue Singulair and Xyzal. Increase Flonase to daily use and begin Azelastine spray twice daily. Use Vaseline in nostrils around 1 hour after nasal sprays to prevent over drying.  Sip warm fluids to ease cough.    Negative for influenza and COVID in office today.        Follow Up:   Return if symptoms worsen or fail to improve.    Patient was given instructions and counseling regarding her condition or for health maintenance advice. Please see specific information pulled into the AVS if appropriate.     Alysia Pham PA-C  [facility]     Please note that portions of this note may have been completed with a voice recognition program.

## 2025-01-17 RX ORDER — MONTELUKAST SODIUM 10 MG/1
10 TABLET ORAL NIGHTLY
Qty: 30 TABLET | Refills: 1 | Status: SHIPPED | OUTPATIENT
Start: 2025-01-17

## 2025-02-24 ENCOUNTER — OFFICE VISIT (OUTPATIENT)
Dept: PULMONOLOGY | Facility: CLINIC | Age: 60
End: 2025-02-24
Payer: COMMERCIAL

## 2025-02-24 ENCOUNTER — LAB (OUTPATIENT)
Dept: LAB | Facility: HOSPITAL | Age: 60
End: 2025-02-24
Payer: COMMERCIAL

## 2025-02-24 VITALS
BODY MASS INDEX: 30.28 KG/M2 | HEIGHT: 68 IN | HEART RATE: 68 BPM | WEIGHT: 199.8 LBS | SYSTOLIC BLOOD PRESSURE: 122 MMHG | DIASTOLIC BLOOD PRESSURE: 74 MMHG | OXYGEN SATURATION: 97 % | RESPIRATION RATE: 18 BRPM

## 2025-02-24 DIAGNOSIS — R06.02 SOB (SHORTNESS OF BREATH): Primary | ICD-10-CM

## 2025-02-24 DIAGNOSIS — J30.89 OTHER ALLERGIC RHINITIS: ICD-10-CM

## 2025-02-24 DIAGNOSIS — J45.30 MILD PERSISTENT ASTHMA WITHOUT COMPLICATION: ICD-10-CM

## 2025-02-24 DIAGNOSIS — R05.3 COUGH, PERSISTENT: Primary | ICD-10-CM

## 2025-02-24 PROCEDURE — 86003 ALLG SPEC IGE CRUDE XTRC EA: CPT

## 2025-02-24 PROCEDURE — 95012 NITRIC OXIDE EXP GAS DETER: CPT | Performed by: INTERNAL MEDICINE

## 2025-02-24 PROCEDURE — 82785 ASSAY OF IGE: CPT

## 2025-02-24 PROCEDURE — 99204 OFFICE O/P NEW MOD 45 MIN: CPT | Performed by: INTERNAL MEDICINE

## 2025-02-24 PROCEDURE — 94060 EVALUATION OF WHEEZING: CPT | Performed by: INTERNAL MEDICINE

## 2025-02-24 NOTE — PROGRESS NOTES
"  CONSULT NOTE    Requested by:   Sushila Stone, *   Fawn Fischer MD      Chief Complaint   Patient presents with    Breathing Problem    Consult       Subjective:  Agatha Carbajal is a 59 y.o. female.   Patient comes in for consultation because of cough.    The patient says that she has had a cough for \"a while\".  she says that the cough has been ongoing for at least a few months.     She had URI in June. She also had CoVid, in August 2024.     she is unable to pinpoint approximate year when it started.  she feels that the cough started June 2024    she does not seem to have any significant triggers for the cough.  she describes the cough is mostly dry but occasionally productive of clear sputum.      The patient does not have pets    The patient does have any known family history of asthma. she does not have a personal history of asthma.    she does have issues with allergies.    she denies addition of any new medications over the past few months to years.    Flonase and Azelastine have helped.         The following portions of the patient's history were reviewed and updated as appropriate: allergies, current medications, past family history, past medical history, past social history, and past surgical history.    Review of Systems   HENT:  Negative for sinus pressure, sneezing and sore throat.    Respiratory:  Positive for cough. Negative for chest tightness, shortness of breath and wheezing.    Psychiatric/Behavioral:  Negative for sleep disturbance.    All other systems reviewed and are negative.      Past Medical History:   Diagnosis Date    Acid reflux     Arthritis     Bilateral ovarian cysts     Breast cancer 2009    RIGHT    Bronchitis     Kidney stone     Migraine headache     MVP (mitral valve prolapse)     Osteoporosis     Piercing     EARS ONLY    Skin cancer     BASAL CELL ON CHEST, SQUAMOUS CELL RIGHT ARM    Tinnitus     Wears glasses     TO READ       Social History     Tobacco Use    Smoking " "status: Never     Passive exposure: Never    Smokeless tobacco: Never   Substance Use Topics    Alcohol use: No         Objective:  Visit Vitals  /74   Pulse 68   Resp 18   Ht 172.7 cm (67.99\")   Wt 90.6 kg (199 lb 12.8 oz)   SpO2 97%   BMI 30.39 kg/m²       BMI Readings from Last 8 Encounters:   02/24/25 30.39 kg/m²   12/10/24 29.19 kg/m²   11/15/24 29.35 kg/m²   09/30/24 27.37 kg/m²   08/23/24 27.37 kg/m²   06/13/24 28.90 kg/m²   06/05/24 29.35 kg/m²   03/06/24 29.35 kg/m²       Physical Exam  Vitals reviewed.   Constitutional:       Appearance: She is well-developed.   HENT:      Nose:      Comments: Nasal erythema noted.     Mouth/Throat:      Mouth: Mucous membranes are moist.      Comments: Oropharynx was somewhat cobblestoned.  Neck:      Thyroid: No thyromegaly.      Vascular: No JVD.   Cardiovascular:      Rate and Rhythm: Normal rate and regular rhythm.      Heart sounds: No murmur heard.  Pulmonary:      Effort: Pulmonary effort is normal. No respiratory distress.      Breath sounds: No wheezing or rales.   Musculoskeletal:      Cervical back: Neck supple.      Comments: Gait was normal.   Skin:     General: Skin is warm and dry.   Neurological:      Mental Status: She is alert and oriented to person, place, and time.   Psychiatric:         Behavior: Behavior normal.         Assessment/Plan:  Diagnoses and all orders for this visit:    1. Cough, persistent (Primary)  -     Spirometry With Bronchodilator  -     Nitric Oxide    2. Other allergic rhinitis  -     IgE; Future  -     Allergens, Zone 8; Future    3. Mild persistent asthma without complication    Other orders  -     Mometasone Furoate 100 MCG/ACT aerosol; Inhale 2 puffs 2 (Two) Times a Day. Rinse mouth with water after use.  Dispense: 13 g; Refill: 5        Return in about 5 months (around 7/24/2025) for Recheck, Labs, For [place] ([place]).    DISCUSSION(if any):  Last chest x-ray was reviewed personally and the results were shared with " the patient.  Images reviewed personally.   Results for orders placed in visit on 06/13/24    XR Chest PA & Lateral    Narrative  XR CHEST PA AND LATERAL    Date of Exam: 6/17/2024 8:01 AM EDT    Indication: cough    Comparison: April 3, 2019    Findings:  No focal or diffuse pulmonary infiltrate is identified.  No pleural effusion or pneumothorax is seen.  Heart size and mediastinal contour appear within normal limits. Mild multilevel disc degeneration in the thoracic spine.    Impression  No radiographic findings of acute cardiopulmonary abnormality.    Electronically Signed: Conrad Vaca  6/18/2024 3:33 PM EDT  Workstation ID: [id number]      I also reviewed her last echocardiogram and shared the results with her.   Results for orders placed in visit on 07/10/23    Adult Transthoracic Echo Complete W/ Cont if Necessary Per Protocol    Interpretation Summary  1.  Normal left ventricular size and systolic function, LVEF 65-70%.  2.  Normal LV diastolic filling pattern.  3.  Normal right ventricular size and systolic function.  4.  Normal left atrial volume index.  5.  Trace mitral regurgitation.      ===========================  ===========================    Laboratory workup also showed   Lab Results   Component Value Date    HGB 13.2 06/17/2024    HGB 12.5 02/27/2023    HGB 13.1 03/25/2021   ,   Lab Results   Component Value Date    HCT 40.5 06/17/2024    HCT 37.7 02/27/2023    HCT 39.5 03/25/2021       Lab Results   Component Value Date    EOSABS 0.17 02/27/2023    EOSABS 0.10 03/25/2021    EOSABS 0.18 09/12/2019    & Laboratory workup also showed   Lab Results   Component Value Date    CO2 26.4 06/17/2024    CO2 28.0 02/27/2023    CO2 28.0 03/25/2021   ,   Lab Results   Component Value Date    TSH 0.709 06/17/2024    TSH 0.479 04/03/2019     FeNO level was 23 today.    Agatha Carbajal  1965    SPIROMETRY Interpretation    Acceptable per ATS criteria.    No obstruction, without good bronchodilator  response  Volumes not reported.     Consider full PFTs, if clinically indicated/necessary.    ===========================  ===========================    Patient will be started on nasal spray for symptoms which are definitely consistent with allergic rhinitis.     I have ordered IgE/RAST panel.    I had a detailed discussion with the patient regarding her symptoms that are very suggestive of asthma    The patient was started on Asmanex with instructions to rinse her mouth with water after use.     Other contributing factors may also need to be treated and this will be discussed with the patient, if and when applicable    Patient was advised to use rescue inhaler for when necessary purposes    Patient was also advised to keep a log of the use of rescue inhaler.    Patient was given reading material.      Dictated utilizing Dragon dictation.    This document was electronically signed by Mirian Sinclair MD on 02/24/25 at 11:10 EST

## 2025-02-26 ENCOUNTER — PATIENT ROUNDING (BHMG ONLY) (OUTPATIENT)
Dept: PULMONOLOGY | Facility: CLINIC | Age: 60
End: 2025-02-26
Payer: COMMERCIAL

## 2025-03-02 LAB
A ALTERNATA IGE QN: <0.1 KU/L
A FUMIGATUS IGE QN: <0.1 KU/L
AMER ROACH IGE QN: <0.1 KU/L
BAHIA GRASS IGE QN: <0.1 KU/L
BERMUDA GRASS IGE QN: <0.1 KU/L
BOXELDER IGE QN: <0.1 KU/L
C HERBARUM IGE QN: <0.1 KU/L
CAT DANDER IGE QN: <0.1 KU/L
CMN PIGWEED IGE QN: <0.1 KU/L
COMMON RAGWEED IGE QN: <0.1 KU/L
CONV CLASS DESCRIPTION: NORMAL
D FARINAE IGE QN: <0.1 KU/L
D PTERONYSS IGE QN: <0.1 KU/L
DOG DANDER IGE QN: <0.1 KU/L
ENGL PLANTAIN IGE QN: <0.1 KU/L
HAZELNUT POLN IGE QN: <0.1 KU/L
IGE SERPL-ACNC: 30 IU/ML (ref 6–495)
JOHNSON GRASS IGE QN: <0.1 KU/L
KENT BLUE GRASS IGE QN: <0.1 KU/L
LONDON PLANE IGE QN: <0.1 KU/L
M RACEMOSUS IGE QN: <0.1 KU/L
MT JUNIPER IGE QN: <0.1 KU/L
MUGWORT IGE QN: <0.1 KU/L
NETTLE IGE QN: <0.1 KU/L
P NOTATUM IGE QN: <0.1 KU/L
S BOTRYOSUM IGE QN: <0.1 KU/L
SHEEP SORREL IGE QN: <0.1 KU/L
SWEET GUM IGE QN: <0.1 KU/L
WHITE ELM IGE QN: <0.1 KU/L
WHITE HICKORY IGE QN: <0.1 KU/L
WHITE MULBERRY IGE QN: <0.1 KU/L
WHITE OAK IGE QN: <0.1 KU/L

## 2025-03-20 ENCOUNTER — DOCUMENTATION (OUTPATIENT)
Dept: PULMONOLOGY | Facility: CLINIC | Age: 60
End: 2025-03-20
Payer: COMMERCIAL

## 2025-03-20 DIAGNOSIS — G43.109 MIGRAINE WITH AURA AND WITHOUT STATUS MIGRAINOSUS, NOT INTRACTABLE: ICD-10-CM

## 2025-03-20 RX ORDER — SUMATRIPTAN SUCCINATE 100 MG/1
TABLET ORAL
Qty: 9 TABLET | Refills: 0 | OUTPATIENT
Start: 2025-03-20

## 2025-04-10 RX ORDER — SUMATRIPTAN SUCCINATE 100 MG/1
TABLET ORAL
Qty: 9 TABLET | Refills: 0 | Status: SHIPPED | OUTPATIENT
Start: 2025-04-10

## 2025-07-18 DIAGNOSIS — R05.8 ALLERGIC COUGH: ICD-10-CM

## 2025-07-18 DIAGNOSIS — R09.82 POSTNASAL DRIP: ICD-10-CM

## 2025-07-18 RX ORDER — AZELASTINE 1 MG/ML
2 SPRAY, METERED NASAL 2 TIMES DAILY
Qty: 30 ML | Refills: 2 | Status: SHIPPED | OUTPATIENT
Start: 2025-07-18

## 2025-07-22 ENCOUNTER — OFFICE VISIT (OUTPATIENT)
Dept: INTERNAL MEDICINE | Facility: CLINIC | Age: 60
End: 2025-07-22
Payer: COMMERCIAL

## 2025-07-22 VITALS
DIASTOLIC BLOOD PRESSURE: 73 MMHG | OXYGEN SATURATION: 98 % | WEIGHT: 206.4 LBS | SYSTOLIC BLOOD PRESSURE: 139 MMHG | TEMPERATURE: 97.2 F | RESPIRATION RATE: 18 BRPM | HEART RATE: 63 BPM | HEIGHT: 68 IN | BODY MASS INDEX: 31.28 KG/M2

## 2025-07-22 DIAGNOSIS — Z00.00 ROUTINE GENERAL MEDICAL EXAMINATION AT A HEALTH CARE FACILITY: Primary | ICD-10-CM

## 2025-07-22 PROCEDURE — 99396 PREV VISIT EST AGE 40-64: CPT | Performed by: INTERNAL MEDICINE

## 2025-07-22 RX ORDER — RIMEGEPANT SULFATE 75 MG/75MG
75 TABLET, ORALLY DISINTEGRATING ORAL ONCE
COMMUNITY
Start: 2025-04-17 | End: 2025-07-22

## 2025-07-22 RX ORDER — SUMATRIPTAN SUCCINATE 100 MG/1
TABLET ORAL
Qty: 9 TABLET | Refills: 5 | Status: SHIPPED | OUTPATIENT
Start: 2025-07-22

## 2025-07-22 NOTE — PROGRESS NOTES
Subjective   Agatha Carbajal is a 59 y.o. female.     Chief Complaint   Patient presents with    Weight Gain     Abnormal wt gain since December    Annual Exam       History of Present Illness   Patient is here for annual visit and due for labs for cholesterol, she complains of weight gain      Past Medical History:   Diagnosis Date    Acid reflux     Allergic rhinitis     Arthritis     Bilateral ovarian cysts     Breast cancer 2009    RIGHT    Bronchitis     Kidney stone     Migraine headache     MVP (mitral valve prolapse)     Osteoporosis     Piercing     EARS ONLY    Skin cancer     BASAL CELL ON CHEST, SQUAMOUS CELL RIGHT ARM    Tinnitus     Wears glasses     TO READ       Past Surgical History:   Procedure Laterality Date    BREAST AUGMENTATION Bilateral 2010    BREAST BIOPSY Right     COLONOSCOPY  2016    COLONOSCOPY N/A 3/20/2023    Procedure: COLONOSCOPY with polypectomy;  Surgeon: Mick Triplett MD;  Location: [facility];  Service: Gastroenterology;  Laterality: N/A;    ENDOSCOPY N/A 2/15/2019    Procedure: ESOPHAGOGASTRODUODENOSCOPY WITH COLD FORCEP BIOPSIES;  Surgeon: Mick Triplett MD;  Location: [facility];  Service: Gastroenterology    HYSTERECTOMY      COMPLETE    MASTECTOMY Bilateral 2009    SKIN BIOPSY      chest, right arm       Family History   Problem Relation Age of Onset    Arthritis Mother     Migraines Mother     Prostate cancer Father     Hypertension Father     Kidney disease Father     Obesity Father     Heart failure Father     Breast cancer Sister     Cancer Sister     Heart disease Maternal Grandmother     Osteoporosis Maternal Grandmother     Asthma Maternal Grandmother     Heart disease Paternal Grandfather     Tuberculosis Paternal Grandfather         reports that she has never smoked. She has never been exposed to tobacco smoke. She has never used smokeless tobacco. She reports that she does not drink alcohol and does not use drugs.    Allergies   Allergen Reactions     "Hydrocortisone Other (See Comments)    Oxycodone-Acetaminophen Other (See Comments)    Bactrim [Sulfamethoxazole-Trimethoprim] Rash    Cefdinir Rash    Demerol [Meperidine] Nausea And Vomiting    Effexor [Venlafaxine] Other (See Comments)     Weird sensation    Oxycodone Other (See Comments)     HEADACHE             Current Outpatient Medications:     albuterol sulfate  (90 Base) MCG/ACT inhaler, INHALE 2 PUFFS BY MOUTH EVERY 4 HOURS AS NEEDED FOR COUGH, Disp: , Rfl:     Ascorbic Acid (Vitamin C) 100 MG chewable tablet, Daily., Disp: , Rfl:     azelastine (ASTELIN) 0.1 % nasal spray, Administer 2 sprays into the nostril(s) as directed by provider 2 (Two) Times a Day. Use in each nostril as directed, Disp: 30 mL, Rfl: 2    calcium citrate-vitamin d (CALCITRATE) 315-250 MG-UNIT tablet tablet, Take 2 tablets by mouth Daily., Disp: , Rfl:     cetirizine (zyrTEC) 10 MG tablet, Take 1 tablet by mouth Daily., Disp: , Rfl:     Mometasone Furoate 100 MCG/ACT aerosol, Inhale 2 puffs 2 (Two) Times a Day. Rinse mouth with water after use., Disp: 13 g, Rfl: 5    montelukast (Singulair) 10 MG tablet, Take 1 tablet by mouth Every Night., Disp: 30 tablet, Rfl: 1    SUMAtriptan (Imitrex) 100 MG tablet, Take one tablet at onset of headache. May repeat dose one time in 2 hours if headache not relieved., Disp: 9 tablet, Rfl: 5    pantoprazole (PROTONIX) 40 MG EC tablet, Take 1 tablet by mouth Daily. Half hour prior  To breakfast (Patient not taking: Reported on 7/22/2025), Disp: 30 tablet, Rfl: 0      Objective   Blood pressure 139/73, pulse 63, temperature 97.2 °F (36.2 °C), temperature source Temporal, resp. rate 18, height 172.7 cm (68\"), weight 93.6 kg (206 lb 6.4 oz), SpO2 98%, not currently breastfeeding.    Physical Exam  Vitals and nursing note reviewed.   Constitutional:       General: She is not in acute distress.     Appearance: Normal appearance. She is not diaphoretic.   HENT:      Head: Normocephalic and " atraumatic.      Right Ear: External ear normal.      Left Ear: External ear normal.      Nose: Nose normal.   Eyes:      Extraocular Movements: Extraocular movements intact.      Conjunctiva/sclera: Conjunctivae normal.   Neck:      Trachea: Trachea normal.   Cardiovascular:      Rate and Rhythm: Normal rate and regular rhythm.      Heart sounds: Normal heart sounds.   Pulmonary:      Effort: Pulmonary effort is normal. No respiratory distress.      Breath sounds: Normal breath sounds.   Abdominal:      General: Abdomen is flat.   Musculoskeletal:      Cervical back: Neck supple.      Comments: Moves all limbs   Skin:     General: Skin is warm.   Neurological:      Mental Status: She is alert and oriented to person, place, and time.      Comments: No gross motor or sensory deficits         BMI is >= 30 and <35. (Class 1 Obesity). The following options were offered after discussion;: weight loss educational material (shared in after visit summary), exercise counseling/recommendations, and nutrition counseling/recommendations      Results for orders placed or performed in visit on 02/24/25   Allergens, Zone 8    Collection Time: 02/24/25  2:02 PM    Specimen: Blood   Result Value Ref Range    Class Description Comment     D. pteronyssinus (dust mite) <0.10 Class 0 kU/L    D. farinae (dust mite) <0.10 Class 0 kU/L    Cat Dander <0.10 Class 0 kU/L    Dog Dander, IgE <0.10 Class 0 kU/L    [place] Grass <0.10 Class 0 kU/L    [place] Bluegrass IgE <0.10 Class 0 kU/L    [place] Grass <0.10 Class 0 kU/L    [place] Grass <0.10 Class 0 kU/L    Cockroach, American <0.10 Class 0 kU/L    Penicillium chrysogen <0.10 Class 0 kU/L    Cladosporium herbarum <0.10 Class 0 kU/L    Aspergillus fumigatus <0.10 Class 0 kU/L    Mucor racemosus <0.10 Class 0 kU/L    Alternaria alternata <0.10 Class 0 kU/L    Stemphylium herbarum <0.10 Class 0 kU/L    Golden, White <0.10 Class 0 kU/L    Elm, American <0.10 Class 0 kU/L    Maple/[place] <0.10  Class 0 kU/L    Hazelnut Tree <0.10 Class 0 kU/L    [place], White <0.10 Class 0 kU/L    Maple [place] <0.10 Class 0 kU/L    White [place] <0.10 Class 0 kU/L    [place], Mountain <0.10 Class 0 kU/L    Sweet Gum <0.10 Class 0 kU/L    Ragweed, Common/Short <0.10 Class 0 kU/L    Mugwort <0.10 Class 0 kU/L    English Plantain <0.10 Class 0 kU/L    Pigweed, Rough/Common <0.10 Class 0 kU/L    Sheep Sorrel <0.10 Class 0 kU/L    Nettle <0.10 Class 0 kU/L   IgE    Collection Time: 02/24/25  2:02 PM    Specimen: Blood   Result Value Ref Range    IgE 30 6 - 495 IU/mL         Assessment & Plan   Diagnoses and all orders for this visit:    1. Routine general medical examination at a health care facility (Primary)  -     CBC (No Diff)  -     Comprehensive Metabolic Panel  -     Lipid Panel  -     Hemoglobin A1c  -     TSH  -     Vitamin B12    Other orders  -     SUMAtriptan (Imitrex) 100 MG tablet; Take one tablet at onset of headache. May repeat dose one time in 2 hours if headache not relieved.  Dispense: 9 tablet; Refill: 5      plan:  1.physical: Physical exam conducted today,  Will obtain fasting lab work,   Impression: healthy adult Patient. Currently, patient eats a healthy diet. Screening lab work includes glucose, lipid profile , complete blood count and comprehensive panel . The risks and benefits of immunizations were discussed and immunizations are up to date. Advice and education were given regarding nutrition and aerobic exercise. Patient discussion: discussed with the patient.  Physical with preventive exam as well as age and risk appropriate counseling completed.   Patient Discussion: plan discussed with the patient, follow-up visit needed in one year. Medication Review and medication list updated  Advised Monthly self breast assessment and annual breast imaging and Calcium, 600 mg/ Vit. D, 400 IU daily; regular weight-bearing exercise, diet and exercise counseled to help lose weight, she will contact the  office if she is interested in GLP-1 agonist               Fawn Fischer MD

## 2025-07-24 ENCOUNTER — OFFICE VISIT (OUTPATIENT)
Dept: PULMONOLOGY | Facility: CLINIC | Age: 60
End: 2025-07-24
Payer: COMMERCIAL

## 2025-07-24 VITALS
HEIGHT: 68 IN | OXYGEN SATURATION: 97 % | HEART RATE: 68 BPM | WEIGHT: 199.8 LBS | SYSTOLIC BLOOD PRESSURE: 124 MMHG | DIASTOLIC BLOOD PRESSURE: 76 MMHG | BODY MASS INDEX: 30.28 KG/M2 | RESPIRATION RATE: 18 BRPM

## 2025-07-24 DIAGNOSIS — J45.30 MILD PERSISTENT ASTHMA WITHOUT COMPLICATION: ICD-10-CM

## 2025-07-24 DIAGNOSIS — R05.3 COUGH, PERSISTENT: Primary | ICD-10-CM

## 2025-07-24 DIAGNOSIS — R05.8 ALLERGIC COUGH: ICD-10-CM

## 2025-07-24 DIAGNOSIS — R09.82 POSTNASAL DRIP: ICD-10-CM

## 2025-07-24 DIAGNOSIS — J30.89 OTHER ALLERGIC RHINITIS: ICD-10-CM

## 2025-07-24 PROCEDURE — 99214 OFFICE O/P EST MOD 30 MIN: CPT | Performed by: NURSE PRACTITIONER

## 2025-07-24 RX ORDER — FLUTICASONE PROPIONATE 50 MCG
1 SPRAY, SUSPENSION (ML) NASAL DAILY
COMMUNITY

## 2025-07-24 RX ORDER — AZELASTINE 1 MG/ML
2 SPRAY, METERED NASAL 2 TIMES DAILY
Qty: 30 ML | Refills: 5 | Status: SHIPPED | OUTPATIENT
Start: 2025-07-24

## 2025-07-24 NOTE — PROGRESS NOTES
"Chief Complaint   Patient presents with    Breathing Problem    Follow-up         Subjective   Agatha Carbajal is a 59 y.o. female.   Patient is here today for follow up of asthma and shortness of breath.     Patient says that since the last office visit she has had no recent exacerbations. she denies any emergency room visits or hospitalizations, due to her asthma.     The patient says that she is compliant with pulmonary medicines, as prescribed. She does not use mometasone inhaler because it seems to make the cough worse.     She has been using astelin and flonase.     She takes zyrtec daily.     She stopped singulair because it did not seem to help.     She took allergy shots when she was young. She states they did not help. She was taken to chiropractor and this resolved all symptoms.     She has never had SOB.     She f/w chiropractor and states the adjustment helps clear sinuses.      The following portions of the patient's history were reviewed and updated as appropriate: allergies, current medications, past family history, past medical history, past social history, and past surgical history.    Review of Systems   HENT:  Negative for sinus pressure, sneezing and sore throat.    Respiratory:  Negative for cough, chest tightness, shortness of breath and wheezing.    Psychiatric/Behavioral:  Negative for sleep disturbance.        Objective   Visit Vitals  /76   Pulse 68   Resp 18   Ht 172.7 cm (67.99\")   Wt 90.6 kg (199 lb 12.8 oz)   SpO2 97%   BMI 30.39 kg/m²       Physical Exam  Vitals reviewed.   Constitutional:       Appearance: She is well-developed.   HENT:      Head: Atraumatic.      Mouth/Throat:      Mouth: Mucous membranes are moist.      Comments: Drainage noted.   Eyes:      Extraocular Movements: Extraocular movements intact.   Cardiovascular:      Rate and Rhythm: Normal rate and regular rhythm.   Pulmonary:      Effort: Pulmonary effort is normal. No respiratory distress.      Breath sounds: " Normal breath sounds. No wheezing.   Skin:     General: Skin is warm.   Neurological:      Mental Status: She is alert and oriented to person, place, and time.             Assessment & Plan   Diagnoses and all orders for this visit:    1. Cough, persistent (Primary)    2. Other allergic rhinitis    3. Mild persistent asthma without complication    4. Allergic cough  -     azelastine (ASTELIN) 0.1 % nasal spray; Administer 2 sprays into the nostril(s) as directed by provider 2 (Two) Times a Day. Use in each nostril as directed  Dispense: 30 mL; Refill: 5    5. Postnasal drip  -     azelastine (ASTELIN) 0.1 % nasal spray; Administer 2 sprays into the nostril(s) as directed by provider 2 (Two) Times a Day. Use in each nostril as directed  Dispense: 30 mL; Refill: 5           Return in about 6 months (around 1/24/2026) for Recheck, For Dr. Sinclair.    DISCUSSION (if any):  Spirometry consistent with no obstruction.      Latest Reference Range & Units 02/24/25 14:02   Cat Dander Class 0 kU/L <0.10   Dog Dander, IgE Class 0 kU/L <0.10   English Plantain Class 0 kU/L <0.10   Nettle Class 0 kU/L <0.10   Sweet Gum Class 0 kU/L <0.10   [place] Grass Class 0 kU/L <0.10   [place] Grass Class 0 kU/L <0.10   [place] Bluegrass IgE Class 0 kU/L <0.10   Sheep Sorrel Class 0 kU/L <0.10   [place] Grass Class 0 kU/L <0.10   Cockroach, American Class 0 kU/L <0.10   D. farinae (dust mite) Class 0 kU/L <0.10   D. pteronyssinus (dust mite) Class 0 kU/L <0.10   Class Description  Comment   Aspergillus fumigatus Class 0 kU/L <0.10   Cladosporium herbarum Class 0 kU/L <0.10   Mucor racemosus Class 0 kU/L <0.10   Stemphylium herbarum Class 0 kU/L <0.10   [place], Mountain Class 0 kU/L <0.10   Elm, American Class 0 kU/L <0.10   Hazelnut Tree Class 0 kU/L <0.10   [place], White Class 0 kU/L <0.10   Maple[place] Class 0 kU/L <0.10   [place] Class 0 kU/L <0.10   [place], White Class 0 kU/L <0.10   [place] Class 0 kU/L <0.10   Mugwort  Class 0 kU/L <0.10   Pigweed, Rough/Common Class 0 kU/L <0.10   Ragweed, Common/Short Class 0 kU/L <0.10   Penicillium chrysogen Class 0 kU/L <0.10      Latest Reference Range & Units 02/24/25 14:02   IgE 6 - 495 IU/mL 30       She used to take claritin D 1/2 tablet and it helped a lot but she had palpitations so stopped. She was taking the 24 hour.     I recommended trying lower dose of claritin D, the 12 hour, or buying pseudophed alone in a lower dose like 30 mg to see if she can tolerate this.     Her symptoms of asthma are under adequate control at this time.     For allergic continue both nasal sprays and antihistamine.    Patient's medications for underlying asthma were reviewed with her in great detail.    Any needed adjustments to her pulmonary medications, either for clinical or insurance coverage reasons, have been made and are reflected in the orders.    Side effects of prescribed medications discussed with the patient.    Asthma action plan with discussed with her.    The patient was asked to call this office if the symptoms worsen.     Dictated utilizing Dragon dictation.    This document was electronically signed by AVELINO Mancia July 24, 2025  09:59 EDT

## (undated) DEVICE — Device

## (undated) DEVICE — JELLY,LUBE,STERILE,FLIP TOP,TUBE,2-OZ: Brand: MEDLINE

## (undated) DEVICE — FRCP BIOP RADLJAW4 HOT 2.2X240 BX40

## (undated) DEVICE — TP SXN YANKR BULB STRL

## (undated) DEVICE — GLV SURG SENSICARE W/ALOE PF LF 7.5 STRL

## (undated) DEVICE — CONMED SCOPE SAVER BITE BLOCK, 20X27 MM: Brand: SCOPE SAVER

## (undated) DEVICE — VLV SXN AIR/H2O ORCAPOD3 1P/U STRL

## (undated) DEVICE — ENDOSCOPY PORT CONNECTOR FOR OLYMPUS® SCOPES: Brand: ERBE

## (undated) DEVICE — LUBE JELLY PK/2.75GM STRL BX/144

## (undated) DEVICE — KT ORCA VLV SXN AIR/H2O W/SEAL 1P/U STRL

## (undated) DEVICE — SYR LUER SLPTP 50ML

## (undated) DEVICE — ENDOGATOR AUXILIARY WATER JET CONNECTOR: Brand: ENDOGATOR

## (undated) DEVICE — MEDI-VAC NON-CONDUCTIVE SUCTION TUBING: Brand: CARDINAL HEALTH

## (undated) DEVICE — HYBRID TUBING/CAP SET FOR OLYMPUS® SCOPES: Brand: ERBE

## (undated) DEVICE — GLV SURG TRIUMPH MICRO PF LTX 7.5 STRL

## (undated) DEVICE — FRCP BIOP COLD ENDOJAW ALLGTR W/NDL 2.8X2300MM BLU

## (undated) DEVICE — PATIENT RETURN ELECTRODE, SINGLE-USE, CONTACT QUALITY MONITORING, ADULT, WITH 9FT CORD, FOR PATIENTS WEIGING OVER 33LBS. (15KG): Brand: MEGADYNE